# Patient Record
Sex: FEMALE | Race: WHITE | NOT HISPANIC OR LATINO | Employment: FULL TIME | URBAN - METROPOLITAN AREA
[De-identification: names, ages, dates, MRNs, and addresses within clinical notes are randomized per-mention and may not be internally consistent; named-entity substitution may affect disease eponyms.]

---

## 2017-03-27 ENCOUNTER — HOSPITAL ENCOUNTER (OUTPATIENT)
Facility: MEDICAL CENTER | Age: 32
End: 2017-03-27
Attending: OBSTETRICS & GYNECOLOGY | Admitting: OBSTETRICS & GYNECOLOGY
Payer: COMMERCIAL

## 2017-05-03 ENCOUNTER — APPOINTMENT (OUTPATIENT)
Dept: ADMISSIONS | Facility: MEDICAL CENTER | Age: 32
End: 2017-05-03
Attending: OBSTETRICS & GYNECOLOGY
Payer: COMMERCIAL

## 2017-05-03 RX ORDER — CHLORAL HYDRATE 500 MG
2000 CAPSULE ORAL DAILY
Status: ON HOLD | COMMUNITY
End: 2017-05-07 | Stop reason: HOSPADM

## 2017-05-07 ENCOUNTER — HOSPITAL ENCOUNTER (INPATIENT)
Facility: MEDICAL CENTER | Age: 32
LOS: 2 days | End: 2017-05-09
Attending: OBSTETRICS & GYNECOLOGY | Admitting: OBSTETRICS & GYNECOLOGY
Payer: COMMERCIAL

## 2017-05-07 LAB
BASOPHILS # BLD AUTO: 0.5 % (ref 0–1.8)
BASOPHILS # BLD: 0.11 K/UL (ref 0–0.12)
EOSINOPHIL # BLD AUTO: 0.09 K/UL (ref 0–0.51)
EOSINOPHIL NFR BLD: 0.4 % (ref 0–6.9)
ERYTHROCYTE [DISTWIDTH] IN BLOOD BY AUTOMATED COUNT: 42.3 FL (ref 35.9–50)
HCT VFR BLD AUTO: 41.2 % (ref 37–47)
HGB BLD-MCNC: 14.2 G/DL (ref 12–16)
HOLDING TUBE BB 8507: NORMAL
IMM GRANULOCYTES # BLD AUTO: 0.26 K/UL (ref 0–0.11)
IMM GRANULOCYTES NFR BLD AUTO: 1.3 % (ref 0–0.9)
LYMPHOCYTES # BLD AUTO: 3.48 K/UL (ref 1–4.8)
LYMPHOCYTES NFR BLD: 17.4 % (ref 22–41)
MCH RBC QN AUTO: 31.4 PG (ref 27–33)
MCHC RBC AUTO-ENTMCNC: 34.5 G/DL (ref 33.6–35)
MCV RBC AUTO: 91.2 FL (ref 81.4–97.8)
MONOCYTES # BLD AUTO: 0.85 K/UL (ref 0–0.85)
MONOCYTES NFR BLD AUTO: 4.2 % (ref 0–13.4)
NEUTROPHILS # BLD AUTO: 15.26 K/UL (ref 2–7.15)
NEUTROPHILS NFR BLD: 76.2 % (ref 44–72)
NRBC # BLD AUTO: 0 K/UL
NRBC BLD AUTO-RTO: 0 /100 WBC
PLATELET # BLD AUTO: 201 K/UL (ref 164–446)
PMV BLD AUTO: 11.1 FL (ref 9–12.9)
RBC # BLD AUTO: 4.52 M/UL (ref 4.2–5.4)
WBC # BLD AUTO: 20.1 K/UL (ref 4.8–10.8)

## 2017-05-07 PROCEDURE — 700102 HCHG RX REV CODE 250 W/ 637 OVERRIDE(OP): Performed by: ANESTHESIOLOGY

## 2017-05-07 PROCEDURE — 36415 COLL VENOUS BLD VENIPUNCTURE: CPT

## 2017-05-07 PROCEDURE — 700111 HCHG RX REV CODE 636 W/ 250 OVERRIDE (IP)

## 2017-05-07 PROCEDURE — A9270 NON-COVERED ITEM OR SERVICE: HCPCS | Performed by: ANESTHESIOLOGY

## 2017-05-07 PROCEDURE — 59514 CESAREAN DELIVERY ONLY: CPT

## 2017-05-07 PROCEDURE — 305385 HCHG SURGICAL SERVICES 1/4 HOUR

## 2017-05-07 PROCEDURE — 0UT70ZZ RESECTION OF BILATERAL FALLOPIAN TUBES, OPEN APPROACH: ICD-10-PCS | Performed by: OBSTETRICS & GYNECOLOGY

## 2017-05-07 PROCEDURE — 306828 HCHG ANES-TIME GENERAL: Performed by: OBSTETRICS & GYNECOLOGY

## 2017-05-07 PROCEDURE — 304964 HCHG RECOVERY ROOM TIME 1HR

## 2017-05-07 PROCEDURE — 770002 HCHG ROOM/CARE - OB PRIVATE (112)

## 2017-05-07 PROCEDURE — 88302 TISSUE EXAM BY PATHOLOGIST: CPT

## 2017-05-07 PROCEDURE — 85025 COMPLETE CBC W/AUTO DIFF WBC: CPT

## 2017-05-07 PROCEDURE — 700105 HCHG RX REV CODE 258: Performed by: ANESTHESIOLOGY

## 2017-05-07 PROCEDURE — 700111 HCHG RX REV CODE 636 W/ 250 OVERRIDE (IP): Performed by: OBSTETRICS & GYNECOLOGY

## 2017-05-07 PROCEDURE — 700111 HCHG RX REV CODE 636 W/ 250 OVERRIDE (IP): Performed by: ANESTHESIOLOGY

## 2017-05-07 RX ORDER — SODIUM CHLORIDE, SODIUM LACTATE, POTASSIUM CHLORIDE, CALCIUM CHLORIDE 600; 310; 30; 20 MG/100ML; MG/100ML; MG/100ML; MG/100ML
INJECTION, SOLUTION INTRAVENOUS CONTINUOUS
Status: DISCONTINUED | OUTPATIENT
Start: 2017-05-07 | End: 2017-05-07 | Stop reason: HOSPADM

## 2017-05-07 RX ORDER — KETOROLAC TROMETHAMINE 30 MG/ML
30 INJECTION, SOLUTION INTRAMUSCULAR; INTRAVENOUS EVERY 6 HOURS
Status: COMPLETED | OUTPATIENT
Start: 2017-05-08 | End: 2017-05-08

## 2017-05-07 RX ORDER — SIMETHICONE 80 MG
80 TABLET,CHEWABLE ORAL 4 TIMES DAILY PRN
Status: DISCONTINUED | OUTPATIENT
Start: 2017-05-07 | End: 2017-05-09 | Stop reason: HOSPADM

## 2017-05-07 RX ORDER — DIPHENHYDRAMINE HYDROCHLORIDE 50 MG/ML
12.5 INJECTION INTRAMUSCULAR; INTRAVENOUS EVERY 6 HOURS PRN
Status: DISCONTINUED | OUTPATIENT
Start: 2017-05-07 | End: 2017-05-08

## 2017-05-07 RX ORDER — SODIUM CHLORIDE, SODIUM LACTATE, POTASSIUM CHLORIDE, CALCIUM CHLORIDE 600; 310; 30; 20 MG/100ML; MG/100ML; MG/100ML; MG/100ML
1500 INJECTION, SOLUTION INTRAVENOUS ONCE
Status: COMPLETED | OUTPATIENT
Start: 2017-05-07 | End: 2017-05-07

## 2017-05-07 RX ORDER — VITAMIN A ACETATE, BETA CAROTENE, ASCORBIC ACID, CHOLECALCIFEROL, .ALPHA.-TOCOPHEROL ACETATE, DL-, THIAMINE MONONITRATE, RIBOFLAVIN, NIACINAMIDE, PYRIDOXINE HYDROCHLORIDE, FOLIC ACID, CYANOCOBALAMIN, CALCIUM CARBONATE, FERROUS FUMARATE, ZINC OXIDE, CUPRIC OXIDE 3080; 12; 120; 400; 1; 1.84; 3; 20; 22; 920; 25; 200; 27; 10; 2 [IU]/1; UG/1; MG/1; [IU]/1; MG/1; MG/1; MG/1; MG/1; MG/1; [IU]/1; MG/1; MG/1; MG/1; MG/1; MG/1
1 TABLET, FILM COATED ORAL EVERY MORNING
Status: DISCONTINUED | OUTPATIENT
Start: 2017-05-08 | End: 2017-05-09 | Stop reason: HOSPADM

## 2017-05-07 RX ORDER — MISOPROSTOL 200 UG/1
800 TABLET ORAL
Status: DISCONTINUED | OUTPATIENT
Start: 2017-05-07 | End: 2017-05-07 | Stop reason: HOSPADM

## 2017-05-07 RX ORDER — DOCUSATE SODIUM 100 MG/1
100 CAPSULE, LIQUID FILLED ORAL 2 TIMES DAILY PRN
Status: DISCONTINUED | OUTPATIENT
Start: 2017-05-07 | End: 2017-05-09 | Stop reason: HOSPADM

## 2017-05-07 RX ORDER — ONDANSETRON 2 MG/ML
4 INJECTION INTRAMUSCULAR; INTRAVENOUS EVERY 6 HOURS PRN
Status: DISCONTINUED | OUTPATIENT
Start: 2017-05-07 | End: 2017-05-08

## 2017-05-07 RX ORDER — OXYTOCIN 10 [USP'U]/ML
10 INJECTION, SOLUTION INTRAMUSCULAR; INTRAVENOUS ONCE
Status: DISCONTINUED | OUTPATIENT
Start: 2017-05-07 | End: 2017-05-07 | Stop reason: HOSPADM

## 2017-05-07 RX ORDER — METOCLOPRAMIDE HYDROCHLORIDE 5 MG/ML
10 INJECTION INTRAMUSCULAR; INTRAVENOUS ONCE
Status: COMPLETED | OUTPATIENT
Start: 2017-05-07 | End: 2017-05-07

## 2017-05-07 RX ORDER — OXYCODONE HYDROCHLORIDE AND ACETAMINOPHEN 5; 325 MG/1; MG/1
1 TABLET ORAL EVERY 4 HOURS PRN
Status: DISCONTINUED | OUTPATIENT
Start: 2017-05-07 | End: 2017-05-08

## 2017-05-07 RX ORDER — DIPHENHYDRAMINE HYDROCHLORIDE 50 MG/ML
25 INJECTION INTRAMUSCULAR; INTRAVENOUS EVERY 6 HOURS PRN
Status: DISCONTINUED | OUTPATIENT
Start: 2017-05-07 | End: 2017-05-08

## 2017-05-07 RX ORDER — METHYLERGONOVINE MALEATE 0.2 MG/ML
0.2 INJECTION INTRAVENOUS
Status: DISCONTINUED | OUTPATIENT
Start: 2017-05-07 | End: 2017-05-07 | Stop reason: HOSPADM

## 2017-05-07 RX ORDER — SODIUM CHLORIDE, SODIUM LACTATE, POTASSIUM CHLORIDE, CALCIUM CHLORIDE 600; 310; 30; 20 MG/100ML; MG/100ML; MG/100ML; MG/100ML
INJECTION, SOLUTION INTRAVENOUS PRN
Status: DISCONTINUED | OUTPATIENT
Start: 2017-05-07 | End: 2017-05-09 | Stop reason: HOSPADM

## 2017-05-07 RX ORDER — KETOROLAC TROMETHAMINE 30 MG/ML
30 INJECTION, SOLUTION INTRAMUSCULAR; INTRAVENOUS EVERY 6 HOURS
Status: CANCELLED | OUTPATIENT
Start: 2017-05-08 | End: 2017-05-08

## 2017-05-07 RX ORDER — MISOPROSTOL 200 UG/1
800 TABLET ORAL
Status: DISCONTINUED | OUTPATIENT
Start: 2017-05-07 | End: 2017-05-09 | Stop reason: HOSPADM

## 2017-05-07 RX ORDER — OXYCODONE AND ACETAMINOPHEN 10; 325 MG/1; MG/1
1 TABLET ORAL EVERY 4 HOURS PRN
Status: DISCONTINUED | OUTPATIENT
Start: 2017-05-07 | End: 2017-05-08

## 2017-05-07 RX ORDER — NALOXONE HYDROCHLORIDE 0.4 MG/ML
0.1 INJECTION, SOLUTION INTRAMUSCULAR; INTRAVENOUS; SUBCUTANEOUS PRN
Status: DISCONTINUED | OUTPATIENT
Start: 2017-05-07 | End: 2017-05-08

## 2017-05-07 RX ADMIN — SODIUM CHLORIDE, POTASSIUM CHLORIDE, SODIUM LACTATE AND CALCIUM CHLORIDE 1500 ML: 600; 310; 30; 20 INJECTION, SOLUTION INTRAVENOUS at 18:50

## 2017-05-07 RX ADMIN — SODIUM CITRATE AND CITRIC ACID MONOHYDRATE 30 ML: 500; 334 SOLUTION ORAL at 19:15

## 2017-05-07 RX ADMIN — Medication 125 ML/HR: at 21:32

## 2017-05-07 RX ADMIN — METOCLOPRAMIDE 10 MG: 5 INJECTION, SOLUTION INTRAMUSCULAR; INTRAVENOUS at 19:11

## 2017-05-07 RX ADMIN — FAMOTIDINE 20 MG: 10 INJECTION, SOLUTION INTRAVENOUS at 19:12

## 2017-05-07 ASSESSMENT — LIFESTYLE VARIABLES
EVER_SMOKED: NEVER
EVER_SMOKED: NEVER
DO YOU DRINK ALCOHOL: NO
ALCOHOL_USE: NO

## 2017-05-07 ASSESSMENT — PAIN SCALES - GENERAL
PAINLEVEL_OUTOF10: 0

## 2017-05-07 NOTE — IP AVS SNAPSHOT
Related Content Database (RCDb) Access Code: Activation code not generated  Current Related Content Database (RCDb) Status: Active    Blink Bookinghart  A secure, online tool to manage your health information     [employer]’s Related Content Database (RCDb)® is a secure, online tool that connects you to your personalized health information from the privacy of your home -- day or night - making it very easy for you to manage your healthcare. Once the activation process is completed, you can even access your medical information using the Related Content Database (RCDb) malathi, which is available for free in the Apple Malathi store or Google Play store.     Related Content Database (RCDb) provides the following levels of access (as shown below):   My Chart Features   [facility] Primary Care Doctor [facility]  Specialists [facility]  Urgent  Care Non-[facility]  Primary Care  Doctor   Email your healthcare team securely and privately 24/7 X X X X   Manage appointments: schedule your next appointment; view details of past/upcoming appointments X      Request prescription refills. X      View recent personal medical records, including lab and immunizations X X X X   View health record, including health history, allergies, medications X X X X   Read reports about your outpatient visits, procedures, consult and ER notes X X X X   See your discharge summary, which is a recap of your hospital and/or ER visit that includes your diagnosis, lab results, and care plan. X X       How to register for Related Content Database (RCDb):  1. Go to  [email or web address].  2. Click on the Sign Up Now box, which takes you to the New Member Sign Up page. You will need to provide the following information:  a. Enter your Related Content Database (RCDb) Access Code exactly as it appears at the top of this page. (You will not need to use this code after you’ve completed the sign-up process. If you do not sign up before the expiration date, you must request a new code.)   b. Enter your date of birth.   c. Enter your home email address.   d. Click Submit, and follow the next screen’s instructions.  3. Create a Related Content Database (RCDb) ID. This will  be your YeahMobi login ID and cannot be changed, so think of one that is secure and easy to remember.  4. Create a YeahMobi password. You can change your password at any time.  5. Enter your Password Reset Question and Answer. This can be used at a later time if you forget your password.   6. Enter your e-mail address. This allows you to receive e-mail notifications when new information is available in YeahMobi.  7. Click Sign Up. You can now view your health information.    For assistance activating your YeahMobi account, call [phone]

## 2017-05-07 NOTE — IP AVS SNAPSHOT
5/9/2017    Nadine Fink   [address]  [facility] [address]    Dear Nadine:    [facility] wants to ensure your discharge home is safe and you or your loved ones have had all of your questions answered regarding your care after you leave the hospital.    Below is a list of resources and contact information should you have any questions regarding your hospital stay, follow-up instructions, or active medical symptoms.    Questions or Concerns Regarding… Contact   Medical Questions Related to Your Discharge  (7 days a week, 8am-5pm) Contact a Nurse Care Coordinator   [phone]   Medical Questions Not Related to Your Discharge  (24 hours a day / 7 days a week)  Contact the Nurse Health Line   [phone]    Medications or Discharge Instructions Refer to your discharge packet   or contact your [facility] Primary Care Provider   [phone]   Follow-up Appointment(s) Schedule your appointment via Fluid   or contact Scheduling [phone]   Billing Review your statement via Fluid  or contact Billing [phone]   Medical Records Review your records via Fluid   or contact Medical Records [phone]     You may receive a telephone call within two days of discharge. This call is to make certain you understand your discharge instructions and have the opportunity to have any questions answered. You can also easily access your medical information, test results and upcoming appointments via the Fluid free online health management tool. You can learn more and sign up at Flattr/Fluid. For assistance setting up your Fluid account, please call [phone].    Once again, we want to ensure your discharge home is safe and that you have a clear understanding of any next steps in your care. If you have any questions or concerns, please do not hesitate to contact us, we are here for you. Thank you for choosing [facility] for your healthcare needs.    Sincerely,    Your [facility] Healthcare Team

## 2017-05-07 NOTE — IP AVS SNAPSHOT
Home Care Instructions                                                                                                                Nadine Fink   MRN: [id number]    Department:  POST PARTUM 31   2017           Follow-up Information     1. Follow up with Patti Shah M.D.. Schedule an appointment as soon as possible for a visit in 1 week.    Specialty:  OB/Gyn    Contact information    [address]  [place] [address]  [phone]         I assume responsibility for securing a follow-up [place] Screening blood test on my baby within the specified date range.    -                  Discharge Instructions       POSTPARTUM DISCHARGE INSTRUCTIONS FOR MOM    YOB: 1985   Age: 32 y.o.               Admit Date: 2017     Discharge Date: 2017  Attending Doctor:  Patti Shah M.D.                  Allergies:  Sulfa drugs    Discharged to home by car. Discharged via wheelchair, hospital escort: Yes.  Special equipment needed: Not Applicable  Belongings with: Personal  Be sure to schedule a follow-up appointment with your primary care doctor or any specialists as instructed.     Discharge Plan:   Influenza Vaccine Indication: Not indicated: Previously immunized this influenza season and > 8 years of age    REASONS TO CALL YOUR OBSTETRICIAN:  1.   Persistent fever or shaking chills (Temperature higher than 100.4)  2.   Heavy bleeding (soaking more than 1 pad per hour); Passing clots  3.   Foul odor from vagina  4.   Mastitis (Breast infection; breast pain, chills, fever, redness)  5.   Urinary pain, burning or frequency  6.   Episiotomy infection  7.   Abdominal incision infection  8.   Severe depression longer than 24 hours    HAND WASHING  · Prior to handling the baby.  · Before breastfeeding or bottle feeding baby.  · After using the bathroom or changing the baby's diaper.    WOUND CARE  Ask your physician for additional care instructions.  In general:    ·  Incision:     "  · Keep clean and dry.    · Do NOT lift anything heavier than your baby for up to 6 weeks.    · There should not be any opening or pus.      VAGINAL CARE  · Nothing inside vagina for 6 weeks: no sexual intercourse, tampons or douching.  · Bleeding may continue for 2-4 weeks.  Amount may vary.    · Call your physician for heavy bleeding which means soaking more than 1 pad per hour    BIRTH CONTROL  · It is possible to become pregnant at any time after delivery and while breastfeeding.  · Plan to discuss a method of birth control with your physician at your follow up visit. visit.    DIET AND ELIMINATION  · Eating more fiber (bran cereal, fruits, and vegetables) and drinking plenty of fluids will help to avoid constipation.  · Urinary frequency after childbirth is normal.    POSTPARTUM BLUES  During the first few days after birth, you may experience a sense of the \"blues\" which may include impatience, irritability or even crying.  These feeling come and go quickly.  However, as many as 1 in 10 women experience emotional symptoms known as postpartum depression.    Postpartum depression:  May start as early as the second or third day after delivery or take several weeks or months to develop.  Symptoms of \"blues\" are present, but are more intense:  Crying spells; loss of appetite; feelings of hopelessness or loss of control; fear of touching the baby; over concern or no concern at all about the baby; little or no concern about your own appearance/caring for yourself; and/or inability to sleep or excessive sleeping.  Contact your physician if you are experiencing any of these symptoms.    Crisis Hotline:  · [place] Crisis Hotline:  0-865-FGROYHZ  Or 1-108.746.3436  · [place] Crisis Hotline:  1-519.753.4553  Or 843-497-3634    PREVENTING SHAKEN BABY:  If you are angry or stressed, PUT THE BABY IN THE CRIB, step away, take some deep breaths, and wait until you are calm to care for the baby.  DO NOT SHAKE THE BABY.  You are " "not alone, call a supporter for help.    · Crisis Call Center 24/7 crisis line 940-299-3982 or 1-946.529.9274  · You can also text them, text \"ANSWER\" to 777295    QUIT SMOKING/TOBACCO USE:  I understand the use of any tobacco products increases my chance of suffering from future heart disease and could cause other illnesses which may shorten my life. Quitting the use of tobacco products is the single most important thing I can do to improve my health. For further information on smoking / tobacco cessation call a Toll Free Quit Line at 1-162.704.1787 (*National Cancer [place]) or 1-671.220.6901 (American Lung Association) or you can access the web based program at www.lungusa.org.    · [place] Tobacco Users Help Line:  (523) 896-3154       Toll Free: 1-415.517.2877  · Quit Tobacco Program [facility] (671)079-7858    DEPRESSION / SUICIDE RISK:  As you are discharged from this [facility], it is important to learn how to keep safe from harming yourself.    Recognize the warning signs:  · Abrupt changes in personality, positive or negative- including increase in energy   · Giving away possessions  · Change in eating patterns- significant weight changes-  positive or negative  · Change in sleeping patterns- unable to sleep or sleeping all the time   · Unwillingness or inability to communicate  · Depression  · Unusual sadness, discouragement and loneliness  · Talk of wanting to die  · Neglect of personal appearance   · Rebelliousness- reckless behavior  · Withdrawal from people/activities they love  · Confusion- inability to concentrate     If you or a loved one observes any of these behaviors or has concerns about self-harm, here's what you can do:  · Talk about it- your feelings and reasons for harming yourself  · Remove any means that you might use to hurt yourself (examples: pills, rope, extension cords, firearm)  · Get professional help from the community (Mental Health, Substance " Abuse, psychological counseling)  · Do not be alone:Call your Safe Contact- someone whom you trust who will be there for you.  · Call your local CRISIS HOTLINE 814-7591 or 889-716-6805  · Call your local Children's Mobile Crisis Response Team [place] (801) 766-2917 or www.Sticky  · Call the toll free National Suicide Prevention Hotlines   · National Suicide Prevention Lifeline 801-924-LCJU (7780)  · [place] Hope Line Network 800-SUICIDE (189-6411)    DISCHARGE SURVEY:  Thank you for choosing [facility].  We hope we provided you with very good care.  You may be receiving a survey in the mail.  Please fill it out.  Your opinion is valuable to us.    ADDITIONAL EDUCATIONAL MATERIALS GIVEN TO PATIENT:        My signature on this form indicates that:  1.  I have reviewed and understand the above information  2.  My questions regarding this information have been answered to my satisfaction.  3.  I have formulated a plan with my discharge nurse to obtain my prescribed medication for home.         Discharge Medication Instructions:    Below are the medications your physician expects you to take upon discharge:    Review all your home medications and newly ordered medications with your doctor and/or pharmacist. Follow medication instructions as directed by your doctor and/or pharmacist.    Please keep your medication list with you and share with your physician.               Medication List      START taking these medications        Instructions    Morning Afternoon Evening Bedtime    ibuprofen 800 MG Tabs   Last time this was given:  600 mg on 5/9/2017  8:43 AM   Commonly known as:  MOTRIN        Take 1 Tab by mouth every 8 hours as needed.   Dose:  800 mg                        oxycodone-acetaminophen 5-325 MG Tabs   Commonly known as:  PERCOCET        Take 1-2 Tabs by mouth every four hours as needed.   Dose:  1-2 Tab                             Where to Get Your Medications      Information about  where to get these medications is not yet available     ! Ask your nurse or doctor about these medications    - ibuprofen 800 MG Tabs  - oxycodone-acetaminophen 5-325 MG Tabs            Crisis Hotline:     [place] Crisis Hotline:  5-707-PEUKIVZ or 1-923.250.1964    [place] Crisis Hotline:    1-707.559.1103 or 023-291-6393        Disclaimer           _____________________________________                     __________       ________       Patient/Mother Signature or Legal                          Date                   Time

## 2017-05-08 LAB
ERYTHROCYTE [DISTWIDTH] IN BLOOD BY AUTOMATED COUNT: 43.9 FL (ref 35.9–50)
HCT VFR BLD AUTO: 34 % (ref 37–47)
HGB BLD-MCNC: 11.4 G/DL (ref 12–16)
MCH RBC QN AUTO: 31.1 PG (ref 27–33)
MCHC RBC AUTO-ENTMCNC: 33.5 G/DL (ref 33.6–35)
MCV RBC AUTO: 92.9 FL (ref 81.4–97.8)
PLATELET # BLD AUTO: 174 K/UL (ref 164–446)
PMV BLD AUTO: 11 FL (ref 9–12.9)
RBC # BLD AUTO: 3.66 M/UL (ref 4.2–5.4)
WBC # BLD AUTO: 23.7 K/UL (ref 4.8–10.8)

## 2017-05-08 PROCEDURE — 700105 HCHG RX REV CODE 258: Performed by: OBSTETRICS & GYNECOLOGY

## 2017-05-08 PROCEDURE — 770002 HCHG ROOM/CARE - OB PRIVATE (112)

## 2017-05-08 PROCEDURE — 700111 HCHG RX REV CODE 636 W/ 250 OVERRIDE (IP): Performed by: ANESTHESIOLOGY

## 2017-05-08 PROCEDURE — 36415 COLL VENOUS BLD VENIPUNCTURE: CPT

## 2017-05-08 PROCEDURE — 85027 COMPLETE CBC AUTOMATED: CPT

## 2017-05-08 RX ORDER — MORPHINE SULFATE 4 MG/ML
4 INJECTION, SOLUTION INTRAMUSCULAR; INTRAVENOUS
Status: DISCONTINUED | OUTPATIENT
Start: 2017-05-08 | End: 2017-05-09 | Stop reason: HOSPADM

## 2017-05-08 RX ORDER — OXYCODONE HYDROCHLORIDE AND ACETAMINOPHEN 5; 325 MG/1; MG/1
1 TABLET ORAL EVERY 4 HOURS PRN
Status: DISCONTINUED | OUTPATIENT
Start: 2017-05-08 | End: 2017-05-09 | Stop reason: HOSPADM

## 2017-05-08 RX ORDER — OXYCODONE AND ACETAMINOPHEN 10; 325 MG/1; MG/1
1 TABLET ORAL EVERY 4 HOURS PRN
Status: DISCONTINUED | OUTPATIENT
Start: 2017-05-08 | End: 2017-05-09 | Stop reason: HOSPADM

## 2017-05-08 RX ORDER — IBUPROFEN 600 MG/1
600 TABLET ORAL EVERY 6 HOURS PRN
Status: DISCONTINUED | OUTPATIENT
Start: 2017-05-08 | End: 2017-05-09 | Stop reason: HOSPADM

## 2017-05-08 RX ADMIN — KETOROLAC TROMETHAMINE 30 MG: 30 INJECTION, SOLUTION INTRAMUSCULAR at 19:42

## 2017-05-08 RX ADMIN — SODIUM CHLORIDE, POTASSIUM CHLORIDE, SODIUM LACTATE AND CALCIUM CHLORIDE: 600; 310; 30; 20 INJECTION, SOLUTION INTRAVENOUS at 05:14

## 2017-05-08 RX ADMIN — KETOROLAC TROMETHAMINE 30 MG: 30 INJECTION, SOLUTION INTRAMUSCULAR at 13:50

## 2017-05-08 RX ADMIN — KETOROLAC TROMETHAMINE 30 MG: 30 INJECTION, SOLUTION INTRAMUSCULAR at 08:02

## 2017-05-08 RX ADMIN — KETOROLAC TROMETHAMINE 30 MG: 30 INJECTION, SOLUTION INTRAMUSCULAR at 02:18

## 2017-05-08 ASSESSMENT — PAIN SCALES - GENERAL
PAINLEVEL_OUTOF10: 1
PAINLEVEL_OUTOF10: 0
PAINLEVEL_OUTOF10: 0
PAINLEVEL_OUTOF10: 3
PAINLEVEL_OUTOF10: 0
PAINLEVEL_OUTOF10: 3

## 2017-05-08 NOTE — PROGRESS NOTES
"POD#1    S: Doing well. Pain controlled. Lochia normal. No flatus yet. Angella PO     O: Blood pressure 93/55, pulse 61, temperature 36.4 °C (97.5 °F), resp. rate 18, height 1.6 m (5' 3\"), weight 66.679 kg (147 lb), last menstrual period 2016, SpO2 98 %.  Gen: NAD  Fundus firm below umbilicus  Incision: +staples. Bandage c/d/i    Labs: Hct 41-->34    A/P 31yo  s/p ERCS/B salpingectomy  1. Routine postop care  2. Dispo: home in 1-2 days   "

## 2017-05-08 NOTE — OP REPORT
DATE OF SERVICE:  2017    DATE OF PROCEDURE:  2017.    PREOPERATIVE DIAGNOSES:  1.  Intrauterine pregnancy at 39 weeks.  2.  Previous  section.  3.  Breech presentation.  4.  Active labor with advanced cervical dilatation.  5.  Desires permanent sterilization.    POSTOPERATIVE DIAGNOSES:  1.  Intrauterine pregnancy at 39 weeks.  2.  Previous  section.  3.  Breech presentation.  4.  Active labor with advanced cervical dilatation.  5.  Desires permanent sterilization.    PROCEDURE:  Repeat low transverse  section with bilateral   salpingectomy.    SURGEON:  Nadine Dobbs MD    ASSISTANT:  Samira Roach MD.    ANESTHESIOLOGIST:  Osman Crenshaw MD.    ANESTHESIA:  Spinal anesthesia.    COMPLICATIONS:  None.    ESTIMATED BLOOD LOSS:  400 mL    SPECIMENS:  Include bilateral tubal segments.    INTRAOPERATIVE FINDINGS:  Include a male infant, complete breech presentation.    Clear amniotic fluid.  Normal appearing uterus, tubes, ovaries bilaterally.    Infant, Apgars are 8 and 9 at 1 and 5 minutes respectively.  Weight pending,   his name Quinton.    NARRATIVE:  After proper consent was obtained, the patient was taken to the   operating room where spinal anesthesia was obtained by Dr. Crenshaw.  She was   placed in dorsal supine position with leftward tilt.  Fetal heart tones were   monitored and reassuring.  Sterile Light catheter was placed and she was   prepped in normal sterile fashion.  After the appropriate time interval, she   was then draped in normal sterile fashion and a Pfannenstiel skin incision is   made with a scalpel directly over her previous scar and carried down to the   underlying fascia with electrocautery.  Fascia was nicked sharply in midline.    Fascial incisions extended laterally with Woody scissors.  Inferior aspect of   fascial incision was grasped with Kochers, elevated, and rectus muscles   dissected off sharply.  Attention was turned to the superior aspect in  a   similar fashion grasped, elevated, and rectus muscles dissected off.  The   rectus muscles  in midline bluntly.  Peritoneum was identified and   entered sharply and peritoneal incision extended inferiorly and superiorly   with good visualization of the bladder.  Bladder blade is inserted.    Vesicouterine peritoneum is tented up and a bladder flap was created using   Metzenbaum scissors.  Bladder is dissected off the lower uterine segment and   bladder blade is reinserted to effectively protect the bladder from the   operative field and the lower uterine segments incised in a transverse   U-shaped fashion with a scalpel.  Uterine incision extended manually.    Membranes ruptured, clear fluid noted.  Infant in a complete breech   presentation, delivered buttocks first, tangential traction to both legs   delivered, wrapped in a moist towel, _____ both arms delivered and the head   was delivered without extension or traction.  The cord was doubly clamped and   cut.  Nose and mouth suctioned with bulb suction and a pink vigorous crying   infant handed directly to the awaiting nursing team.  Placenta was delivered   by gentle traction on the cord and extrauterine massage.  Uterus was   exteriorized and cleared of all clots and debris with a dry laparotomy sponge.    Lower uterine segment was closed with #1 chromic running locked fashion.    Second layer of same suture was used to effectively close uterus in a 2-layer   fashion.  Excellent hemostasis noted.  Verbal consent was once again obtained   from the patient.  She verbalizes her desire for tubal sterilization via   salpingectomy.  Therefore, the right fallopian tube was identified, followed   out to the fimbriated end, grasped at approximately x2 and excised in its   entirety with the LigaSure.  All other areas are hemostatic and then sent to   pathology for confirmation.  Attention then turned to the left fallopian tube   followed out to the  fimbriated end, grasped with Babcocks x2 and the entire   length of fallopian tube, including the fimbriated end are transected and   removed with the LigaSure.  Both sides are evaluated and were hemostatic.  The   uterus was returned to the abdomen.  Specimens were sent to pathology for   confirmation.  The lower uterine segment again reinspected and found to be   hemostatic.  Both sides of tubal salpingectomy are visualized and are   hemostatic.  The peritoneum was grasped with Kocher clamps x3 and closed with   running 3-0 Vicryl suture.  A 3-0 Vicryl was used horizontal mattress to   reapproximate the rectus muscles in the midline.  Copious irrigation.  The   fascia was then closed with 0 Vicryl in a running fashion after the rectus   muscle was examined and found to be hemostatic.  Manual palpation of the   fascial layers confirms adequate thorough closing.  Copious irrigation.    Hemostasis noted.  Subcutaneous fatty layer reapproximated with 3-0 Vicryl   sutures and skin was closed with staples.  Sponge, lap, needle and instrument   counts correct x3, and the patient was taken to the recovery room with her   infant in stable condition.       ____________________________________     MD BEVERLEY VAZQUEZ / MINISTERIO    DD:  05/07/2017 20:44:56  DT:  05/08/2017 00:19:27    D#:  [id number]  Job#:  [id number]    cc: SAY CALABRESE MD

## 2017-05-08 NOTE — PROGRESS NOTES
Pt with positive bowel sounds but not passing flatus. Pt states she is hungry and tolerated clear liquid breakfast. Pt denies any current n/v.

## 2017-05-08 NOTE — PROGRESS NOTES
Patient arrived via gurney with belongings to room S323 at 2140. Report received from COLLEEN Smith. Educated patient on cuddles system, infant identification, emergency pull cord, room call light, and skylight. Bands checked. Assessment done, fundus firm, lochia light, vital signs stable, PIV infusing 125 of Pit in right hand. Pt placed on . Light draining. C/s site CDI. Patient states pain is 0. No meds given. Patient aware of infant sleeping policies. Reviewed plan of care and encouraged to call with needs or prior to ambulating. Call light in place. Will continue to monitor.

## 2017-05-08 NOTE — PROGRESS NOTES
: Pt transferred to OR 1 for repeat  section  : Delivery of viable, male infant. Apgars 2022: Pt transferred to PACU.   : Pt and infant transferred to postpartum in stable condition. Report given to Ngozi MACIAS. ID bands verified. Cuddles activated.

## 2017-05-08 NOTE — OR SURGEON
Immediate Post-Operative Note      PreOp Diagnosis: IUP @ 39 weeks; previous c/section; breech, active labor; desires strelization    PostOp Diagnosis: same    Procedure(s):  REPEAT C SECTION WITH BILATERAL SALPINGECTOMY    Surgeon(s):  Nadine Dobbs M.D.    Anesthesiologist/Type of Anesthesia: Osman Crenshaw MD  Anesthesiologist: (Unknown)/Spinal    Surgical Staff:  Circulator: (Unknown)  Scrub Person: (Unknown)    Specimen: bilateral tubal segments    Estimated Blood Loss: 400cc    Findings: male, complete breech    Complications: none        5/7/2017 8:26 PM Nadine Dobbs

## 2017-05-08 NOTE — H&P
DATE OF ADMISSION:  2017    IDENTIFICATION:  This is a 32-year-old  female  3, para   1-0-1-1, EDC is 2017.  Estimated gestational age is 39 weeks.    CHIEF COMPLAINT:  Contractions.    HISTORY OF PRESENT ILLNESS:  This patient with prenatal care in our office   under the care of Dr. Malena Shah, who is scheduled for repeat    section and bilateral salpingectomy tomorrow.  She also reports to me that the   baby is in a breech presentation and they do not have that documented.  She   showed up with complaints of contractions, was 9 cm dilated with a bulging bag   of water.  No presenting palpable by the evaluating RN.    OBSTETRICAL HISTORY:  Significant for a 38-week male, 8 pound 8 ounce    for polyhydramnios.    GYNECOLOGIC HISTORY:  Benign.  No history of herpes simplex virus, oral or   genital, patient or .    PAST MEDICAL HISTORY:  Illnesses:  None.    PAST SURGICAL HISTORY:   section and oral surgery.    ALLERGIES:  SULFA.    CURRENT MEDICATIONS:  Prenatal vitamins.    SOCIAL HISTORY:  She denies alcohol, tobacco or drug use.  Her 's name   is Didi.    REVIEW OF SYSTEMS:  She denies signs and symptoms of pregnancy-induced   hypertension, reports active fetal movement, no vaginal bleeding and is unsure   about rupture of membranes.    PHYSICAL EXAMINATION:  VITAL SIGNS:  She is afebrile.  Vital signs stable, within normal limits.  LUNGS:  Clear.  HEART:  Regular rate and rhythm.    ASSESSMENT:  1.  Intrauterine pregnancy at 39 weeks.  2.  Active labor with advanced cervical dilatation.  3.  Reportedly breech presentation.  4.  Previous  sections.  5.  Desires permanent sterilization.    PLAN:  The patient is admitted for repeat  delivery and bilateral   salpingectomy at this time.    DISCUSSION:  Risks, benefits, and alternatives discussed in detail including   increased risks with the second  delivery.  Risks  including, but not   limited to risks of anesthesia, risk of injury and other intra-abdominal   pelvic organs including but not limited to bowel, bladder, blood vessels,   ureters, nerves, all requiring further surgical repair; delayed diagnosis,   possible permanent organ damage, risk of hemorrhage requiring transfusion, she   verbally consents to blood transfusion in emergent situation.  Risk of bowel   injury requiring repair up to and including permanent colostomy, risk of   bladder and ureteral injuries requiring repair up to and including permanent   catheterization reimplantation, risk of nerve damage causing chronic pain   syndromes and loss of limb mobility.  She is adamant that she wants no future   pregnancies, risks of bilateral salpingectomy versus tubal ligation versus   none, all discussed in detail.  All of her questions were answered and she   would like to proceed as scheduled for tomorrow.       ____________________________________     MD BEVERLEY VAZQUEZ / MINISTERIO    DD:  05/07/2017 19:13:04  DT:  05/07/2017 22:44:16    D#:  [id number]  Job#:  [id number]

## 2017-05-08 NOTE — CARE PLAN
Problem: Potential for postpartum infection related to surgical incision, compromised uterine condition, urinary tract or respiratory compromise  Goal: Patient will be afebrile and free from signs and symptoms of infection  Outcome: PROGRESSING AS EXPECTED

## 2017-05-08 NOTE — CARE PLAN
Problem: Urinary Elimination:  Goal: Ability to reestablish a normal urinary elimination pattern will improve  Intervention: Evaluate need to continue indwelling urinary catheter  Pt within appropriate time frame for Light. Pt w/ adequate UOP.      Problem: Pain Management  Goal: Pain level will decrease to patient’s comfort goal  Intervention: Follow pain managment plan developed in collaboration with patient and Interdisciplinary Team  Pt getting Toradol scheduled w/ good results.

## 2017-05-08 NOTE — PROGRESS NOTES
Pt's SpO2 dropping to 87-89% on RA while asleep. Pt placed on 1L NC. Maintaining sats. No s/s of respiratory distress.

## 2017-05-08 NOTE — PROGRESS NOTES
Lara Hill R.N. Lactation Consultant Signed  Progress Notes 5/8/2017  9:30 AM      Expand All Collapse All    Lactation note:    Met with this mother whose first baby had a cleft lip and palate and she didn't put baby to breast but pumped and fed her breast milk for a year. She has many questions about holds and latch. Discussed the need for a deep latch for comfort and milk transfer. Baby latched well and nursed for 15 min at the left breast, was too sleepy to latch to other breast. Discussed signs of milk transfer. Asked parents to keep track of baby's feeds and output at home. Mom was given the BF booklet with outpatient lactation resources. Irene Loya, IBCLC

## 2017-05-08 NOTE — CARE PLAN
Problem: Altered physiologic condition related to postoperative  delivery  Goal: Patient physiologically stable as evidenced by normal lochia, palpable uterine involution and vital signs within normal limits  Outcome: PROGRESSING AS EXPECTED

## 2017-05-09 VITALS
TEMPERATURE: 98.1 F | BODY MASS INDEX: 26.05 KG/M2 | WEIGHT: 147 LBS | RESPIRATION RATE: 18 BRPM | HEIGHT: 63 IN | SYSTOLIC BLOOD PRESSURE: 95 MMHG | HEART RATE: 71 BPM | OXYGEN SATURATION: 96 % | DIASTOLIC BLOOD PRESSURE: 64 MMHG

## 2017-05-09 PROCEDURE — A9270 NON-COVERED ITEM OR SERVICE: HCPCS | Performed by: OBSTETRICS & GYNECOLOGY

## 2017-05-09 PROCEDURE — 700102 HCHG RX REV CODE 250 W/ 637 OVERRIDE(OP): Performed by: OBSTETRICS & GYNECOLOGY

## 2017-05-09 PROCEDURE — 700112 HCHG RX REV CODE 229: Performed by: OBSTETRICS & GYNECOLOGY

## 2017-05-09 RX ORDER — IBUPROFEN 800 MG/1
800 TABLET ORAL EVERY 8 HOURS PRN
Qty: 30 TAB | Refills: 2 | Status: SHIPPED | OUTPATIENT
Start: 2017-05-09 | End: 2017-10-27

## 2017-05-09 RX ORDER — OXYCODONE HYDROCHLORIDE AND ACETAMINOPHEN 5; 325 MG/1; MG/1
1-2 TABLET ORAL EVERY 4 HOURS PRN
Qty: 20 TAB | Refills: 0 | Status: SHIPPED | OUTPATIENT
Start: 2017-05-09 | End: 2017-10-27

## 2017-05-09 RX ADMIN — DOCUSATE SODIUM 100 MG: 100 CAPSULE ORAL at 08:43

## 2017-05-09 RX ADMIN — IBUPROFEN 600 MG: 600 TABLET, FILM COATED ORAL at 01:57

## 2017-05-09 RX ADMIN — IBUPROFEN 600 MG: 600 TABLET, FILM COATED ORAL at 08:43

## 2017-05-09 RX ADMIN — Medication 1 TABLET: at 08:43

## 2017-05-09 ASSESSMENT — PAIN SCALES - GENERAL
PAINLEVEL_OUTOF10: 5
PAINLEVEL_OUTOF10: 3

## 2017-05-09 NOTE — CONSULTS
"Spoke with mom regarding breastfeeding.  Mom states that baby is a \"champ\" at the breast and she has no questions or concerns at this time. Outpatient resources reviewed.  "

## 2017-05-09 NOTE — DISCHARGE INSTRUCTIONS
POSTPARTUM DISCHARGE INSTRUCTIONS FOR MOM    YOB: 1985   Age: 32 y.o.               Admit Date: 2017     Discharge Date: 2017  Attending Doctor:  Patti Shah M.D.                  Allergies:  Sulfa drugs    Discharged to home by car. Discharged via wheelchair, hospital escort: Yes.  Special equipment needed: Not Applicable  Belongings with: Personal  Be sure to schedule a follow-up appointment with your primary care doctor or any specialists as instructed.     Discharge Plan:   Influenza Vaccine Indication: Not indicated: Previously immunized this influenza season and > 8 years of age    REASONS TO CALL YOUR OBSTETRICIAN:  1.   Persistent fever or shaking chills (Temperature higher than 100.4)  2.   Heavy bleeding (soaking more than 1 pad per hour); Passing clots  3.   Foul odor from vagina  4.   Mastitis (Breast infection; breast pain, chills, fever, redness)  5.   Urinary pain, burning or frequency  6.   Episiotomy infection  7.   Abdominal incision infection  8.   Severe depression longer than 24 hours    HAND WASHING  · Prior to handling the baby.  · Before breastfeeding or bottle feeding baby.  · After using the bathroom or changing the baby's diaper.    WOUND CARE  Ask your physician for additional care instructions.  In general:    ·  Incision:      · Keep clean and dry.    · Do NOT lift anything heavier than your baby for up to 6 weeks.    · There should not be any opening or pus.      VAGINAL CARE  · Nothing inside vagina for 6 weeks: no sexual intercourse, tampons or douching.  · Bleeding may continue for 2-4 weeks.  Amount may vary.    · Call your physician for heavy bleeding which means soaking more than 1 pad per hour    BIRTH CONTROL  · It is possible to become pregnant at any time after delivery and while breastfeeding.  · Plan to discuss a method of birth control with your physician at your follow up visit. visit.    DIET AND ELIMINATION  · Eating more fiber (bran  "cereal, fruits, and vegetables) and drinking plenty of fluids will help to avoid constipation.  · Urinary frequency after childbirth is normal.    POSTPARTUM BLUES  During the first few days after birth, you may experience a sense of the \"blues\" which may include impatience, irritability or even crying.  These feeling come and go quickly.  However, as many as 1 in 10 women experience emotional symptoms known as postpartum depression.    Postpartum depression:  May start as early as the second or third day after delivery or take several weeks or months to develop.  Symptoms of \"blues\" are present, but are more intense:  Crying spells; loss of appetite; feelings of hopelessness or loss of control; fear of touching the baby; over concern or no concern at all about the baby; little or no concern about your own appearance/caring for yourself; and/or inability to sleep or excessive sleeping.  Contact your physician if you are experiencing any of these symptoms.    Crisis Hotline:  · [place] Crisis Hotline:  9-785-TRJUCYH  Or 1-799.332.8037  · [place] Crisis Hotline:  1-995.212.1849  Or 311-562-2340    PREVENTING SHAKEN BABY:  If you are angry or stressed, PUT THE BABY IN THE CRIB, step away, take some deep breaths, and wait until you are calm to care for the baby.  DO NOT SHAKE THE BABY.  You are not alone, call a supporter for help.    · Crisis Call Center 24/7 crisis line 369-724-6169 or 1-749.977.9999  · You can also text them, text \"ANSWER\" to 986216    QUIT SMOKING/TOBACCO USE:  I understand the use of any tobacco products increases my chance of suffering from future heart disease and could cause other illnesses which may shorten my life. Quitting the use of tobacco products is the single most important thing I can do to improve my health. For further information on smoking / tobacco cessation call a Toll Free Quit Line at 1-168.897.9597 (*National Cancer [place]) or 1-210.701.9156 (American Lung Association) or " you can access the web based program at www.lungusa.org.    · [place] Tobacco Users Help Line:  (532) 375-8919       Toll Free: 1-816.189.6257  · Quit Tobacco Program [facility] (085)462-1344    DEPRESSION / SUICIDE RISK:  As you are discharged from this [facility], it is important to learn how to keep safe from harming yourself.    Recognize the warning signs:  · Abrupt changes in personality, positive or negative- including increase in energy   · Giving away possessions  · Change in eating patterns- significant weight changes-  positive or negative  · Change in sleeping patterns- unable to sleep or sleeping all the time   · Unwillingness or inability to communicate  · Depression  · Unusual sadness, discouragement and loneliness  · Talk of wanting to die  · Neglect of personal appearance   · Rebelliousness- reckless behavior  · Withdrawal from people/activities they love  · Confusion- inability to concentrate     If you or a loved one observes any of these behaviors or has concerns about self-harm, here's what you can do:  · Talk about it- your feelings and reasons for harming yourself  · Remove any means that you might use to hurt yourself (examples: pills, rope, extension cords, firearm)  · Get professional help from the community (Mental Health, Substance Abuse, psychological counseling)  · Do not be alone:Call your Safe Contact- someone whom you trust who will be there for you.  · Call your local CRISIS HOTLINE 596-5174 or 884-039-2846  · Call your local Children's Mobile Crisis Response Team [place] (799) 222-5331 or www.[employer]  · Call the toll free National Suicide Prevention Hotlines   · National Suicide Prevention Lifeline 651-037-ZCAU (7121)  · National Hope Line Network 800-SUICIDE (477-1506)    DISCHARGE SURVEY:  Thank you for choosing [facility].  We hope we provided you with very good care.  You may be receiving a survey in the mail.  Please fill it  out.  Your opinion is valuable to us.    ADDITIONAL EDUCATIONAL MATERIALS GIVEN TO PATIENT:        My signature on this form indicates that:  1.  I have reviewed and understand the above information  2.  My questions regarding this information have been answered to my satisfaction.  3.  I have formulated a plan with my discharge nurse to obtain my prescribed medication for home.

## 2017-05-09 NOTE — CARE PLAN
Problem: Altered physiologic condition related to postoperative  delivery  Goal: Patient physiologically stable as evidenced by normal lochia, palpable uterine involution and vital signs within normal limits  Outcome: PROGRESSING AS EXPECTED  VSS. Fundus firm. Lochia light.    Problem: Alteration in comfort related to surgical incision and/or after birth pains  Goal: Patient verbalizes acceptable pain level  Outcome: PROGRESSING AS EXPECTED  Pt pain managed with PRN pain meds per MAR.

## 2017-05-09 NOTE — PROGRESS NOTES
"POD#2    S: pain controlled. Lochia normal. +flatus and void. Would like to go home today     O: Blood pressure 91/50, pulse 77, temperature 36.9 °C (98.5 °F), resp. rate 16, height 1.6 m (5' 3\"), weight 66.679 kg (147 lb), last menstrual period 2016, SpO2 95 %.  Gen: NAD  Fundus firm below umbilicus  Incision: +staples. No erythema/exudate     Labs: Hct 41-->34    A/P 31yo  s/p ERCS/B salpingectomy  1. Routine postop care  2. Dispo: home today               "

## 2017-05-09 NOTE — PROGRESS NOTES
Assessment complete. Fundus firm, lochia light. VSS. Tolerating diet. Voiding without difficulty. Incision open to air, staples in place. Pain controlled with prn medications per mar. Will offer pain medications as they become available. FOB at bedside, bonding with pt/baby. POC discussed with pt. Encouraged to call with needs. Call light in place.     Pt declines education to be done at this time. States maybe tomorrow.

## 2017-05-09 NOTE — CARE PLAN
Problem: Potential for postpartum infection related to surgical incision, compromised uterine condition, urinary tract or respiratory compromise  Goal: Patient will be afebrile and free from signs and symptoms of infection  Outcome: PROGRESSING AS EXPECTED  No signs of infection noted.

## 2017-05-10 NOTE — DISCHARGE SUMMARY
DATE OF ADMISSION:  2017    DATE OF DISCHARGE:  2017    DIAGNOSES ON ADMISSION:  1.  A 32-year-old  2, para 1, at 39 weeks.  2.  Active labor.  3.  History of prior  section.  4.  Breech presentation.  5.  Desires permanent sterilization.    PROCEDURE:  Repeat  via Pfannenstiel skin incision with bilateral   salpingectomy.    POSTOPERATIVE DIAGNOSES:  1.  A 32-year-old  2, para 1, at 39 weeks.  2.  Active labor.  3.  History of prior  section.  4.  Breech presentation.  5.  Desires permanent sterilization.    HOSPITAL COURSE:  The patient is a 32-year-old  2, para 1 who presented   to the day prior to her scheduled  in active labor.  At the time of   presentation, she had breech presentation and was 9 cm dilated.  She underwent   the procedures as listed above without complications.    Postoperatively, the patient has done well.  She has remained afebrile with   stable vital signs.  Fundus is firm below the umbilicus.  Pain is well   controlled.  Lochia is normal.  She is ambulating and voiding without   difficulty.  Her incision has staples in place, which will be removed and   Steri-Strips placed.    She is Rh positive, rubella immune, GBS negative.    DISCHARGE MEDICATIONS:  1.  Percocet 5/325 one to two tablets p.o. q. 4 hours p.r.n. pain, dispensed   30.  2.  Ibuprofen.    DISCHARGE INSTRUCTIONS:  1.  Patient has been given routine precautions for bleeding, pain, infection,   VTE, difficulty with breastfeeding and postpartum depression.  2.  Routine postoperative care and activity limitations have been reviewed   with her.  3.  The patient will follow up with me in 2 months.       ____________________________________     SAY CALABRESE, MD SANCH[facility] / MINISTERIO    DD:  2017 07:19:29  DT:  2017 19:14:20    D#:  [id number]  Job#:  [id number]

## 2017-10-27 ENCOUNTER — OFFICE VISIT (OUTPATIENT)
Dept: URGENT CARE | Facility: CLINIC | Age: 32
End: 2017-10-27
Payer: COMMERCIAL

## 2017-10-27 ENCOUNTER — HOSPITAL ENCOUNTER (OUTPATIENT)
Facility: MEDICAL CENTER | Age: 32
End: 2017-10-27
Attending: PHYSICIAN ASSISTANT
Payer: COMMERCIAL

## 2017-10-27 VITALS
TEMPERATURE: 97.6 F | DIASTOLIC BLOOD PRESSURE: 70 MMHG | HEART RATE: 62 BPM | WEIGHT: 115 LBS | RESPIRATION RATE: 20 BRPM | SYSTOLIC BLOOD PRESSURE: 110 MMHG | BODY MASS INDEX: 20.38 KG/M2 | OXYGEN SATURATION: 97 % | HEIGHT: 63 IN

## 2017-10-27 DIAGNOSIS — N30.01 ACUTE CYSTITIS WITH HEMATURIA: ICD-10-CM

## 2017-10-27 LAB
APPEARANCE UR: CLEAR
BILIRUB UR STRIP-MCNC: ABNORMAL MG/DL
COLOR UR AUTO: YELLOW
GLUCOSE UR STRIP.AUTO-MCNC: ABNORMAL MG/DL
KETONES UR STRIP.AUTO-MCNC: ABNORMAL MG/DL
LEUKOCYTE ESTERASE UR QL STRIP.AUTO: ABNORMAL
NITRITE UR QL STRIP.AUTO: POSITIVE
PH UR STRIP.AUTO: 8.5 [PH] (ref 5–8)
PROT UR QL STRIP: ABNORMAL MG/DL
RBC UR QL AUTO: ABNORMAL
SP GR UR STRIP.AUTO: 1
UROBILINOGEN UR STRIP-MCNC: ABNORMAL MG/DL

## 2017-10-27 PROCEDURE — 87077 CULTURE AEROBIC IDENTIFY: CPT

## 2017-10-27 PROCEDURE — 99214 OFFICE O/P EST MOD 30 MIN: CPT | Performed by: PHYSICIAN ASSISTANT

## 2017-10-27 PROCEDURE — 87086 URINE CULTURE/COLONY COUNT: CPT

## 2017-10-27 PROCEDURE — 81002 URINALYSIS NONAUTO W/O SCOPE: CPT | Performed by: PHYSICIAN ASSISTANT

## 2017-10-27 PROCEDURE — 87186 SC STD MICRODIL/AGAR DIL: CPT

## 2017-10-27 RX ORDER — NITROFURANTOIN 25; 75 MG/1; MG/1
100 CAPSULE ORAL EVERY 12 HOURS
Qty: 10 CAP | Refills: 0 | Status: SHIPPED | OUTPATIENT
Start: 2017-10-27 | End: 2017-11-01

## 2017-10-27 NOTE — PROGRESS NOTES
Subjective:      Pt is a 32 y.o. female who presents with Dysuria (Couple days urinary pain )            HPI  PT comes into the UC with a chief complaint of dysuria, burning on urination, urgency, frequency, and bladder pressure and has NOT noticed blood in her urine x2-3 days. PT denies fevers or chills, CP, SOB, NVD, paresthesias, headaches, dizziness, change in vision, hives, or joint pain. Pt states she had a UTI a year ago. She states she has not taken any RX meds for this issue. PT states the pain is a 6/10 with urination, aching in nature and worse at night. She denies flank or back pain as well. The pt's medication list, problem list, and allergies have been evaluated and reviewed during today's visit.        PMH:  Negative per pt.      PSH:  Past Surgical History:   Procedure Laterality Date   • REPEAT C SECTION W TUBAL LIGATION  5/7/2017    Procedure: REPEAT C SECTION WITH TUBAL LIGATION;  Surgeon: Nadine Dobbs M.D.;  Location: LABOR AND DELIVERY;  Service:    • PRIMARY C SECTION  8/5/2015    Procedure: PRIMARY C SECTION;  Surgeon: Patti Shah M.D.;  Location: LABOR AND DELIVERY;  Service:        Fam Hx:    family history includes No Known Problems in her father and sister.  Family Status   Relation Status   • Mother Alive   • Father Alive   • Sister Alive   • Son Alive       Soc HX:  Social History     Social History   • Marital status:      Spouse name: N/A   • Number of children: 1   • Years of education: N/A     Occupational History   • /[employer] with [employer]     Social History Main Topics   • Smoking status: Never Smoker   • Smokeless tobacco: Never Used   • Alcohol use No      Comment: none now    • Drug use: No   • Sexual activity: Yes     Partners: Male     Other Topics Concern   • Not on file     Social History Narrative   • No narrative on file         Medications:    Current Outpatient Prescriptions:   •  nitrofurantoin monohydr macro (MACROBID) 100 MG Cap, Take  "1 Cap by mouth every 12 hours for 5 days., Disp: 10 Cap, Rfl: 0      Allergies:  Sulfa drugs    ROS  Review of Systems   Constitutional: Negative for fever, chills and malaise/fatigue.   HENT: Negative for congestion and sore throat.    Eyes: Negative for blurred vision, double vision and photophobia.   Respiratory: Negative for cough and shortness of breath.    Cardiovascular: Negative for chest pain and palpitations.   Gastrointestinal: Negative for nausea, vomiting, abdominal pain, diarrhea and constipation.   Genitourinary: Positive for dysuria, urgency and frequency.   Musculoskeletal: Negative for joint pain and myalgias.   Skin: Negative for rash.   Neurological: Negative for dizziness, tingling and headaches.   Endo/Heme/Allergies: Does not bruise/bleed easily.   Psychiatric/Behavioral: Negative for depression. The patient is not nervous/anxious.           Objective:     /70   Pulse 62   Temp 36.4 °C (97.6 °F)   Resp 20   Ht 1.6 m (5' 3\")   Wt 52.2 kg (115 lb)   SpO2 97%   BMI 20.37 kg/m²      Physical Exam   Physical Exam   Constitutional: She is oriented to person, place, and time. She appears well-developed and well-nourished. No distress.   HENT:   Head: Normocephalic and atraumatic.   Right Ear: External ear normal.   Left Ear: External ear normal.   Nose: Nose normal.   Mouth/Throat: Oropharynx is clear and moist. No oropharyngeal exudate.   Eyes: Conjunctivae normal and EOM are normal. Pupils are equal, round, and reactive to light.   Neck: Normal range of motion. Neck supple. No thyromegaly present.   Cardiovascular: Normal rate, regular rhythm, normal heart sounds and intact distal pulses.  Exam reveals no gallop and no friction rub.    No murmur heard.  Pulmonary/Chest: Effort normal and breath sounds normal. No respiratory distress. She has no wheezes. She has no rales. She exhibits no tenderness.   Abdominal: Soft. Bowel sounds are normal. She exhibits no distension and no mass. " There is no tenderness. There is no rebound and no guarding.   Genitourinary:        Pt deferred   Musculoskeletal: Normal range of motion. She exhibits no edema and no tenderness.   Lymphadenopathy:     She has no cervical adenopathy.   Neurological: She is alert and oriented to person, place, and time. She has normal reflexes. No cranial nerve deficit.   Skin: Skin is warm and dry. No rash noted. No erythema.   Psychiatric: She has a normal mood and affect. Her behavior is normal. Judgment and thought content normal.               Assessment/Plan:     1. Acute cystitis with hematuria    - POCT Urinalysis-->LEUKS AND BLOOD  - Urine Culture; Future  - nitrofurantoin monohydr macro (MACROBID) 100 MG Cap; Take 1 Cap by mouth every 12 hours for 5 days.  Dispense: 10 Cap; Refill: 0    AZO discussed for dysuria  Probiotics discussed  Cranberry tablets discussed  Rest, fluids encouraged.  AVS with medical info given.  Pt was in full understanding and agreement with the plan.  Follow-up as needed if symptoms worsen or fail to improve.

## 2017-10-27 NOTE — PATIENT INSTRUCTIONS
Urinary Tract Infection  A urinary tract infection (UTI) can occur any place along the urinary tract. The tract includes the kidneys, ureters, bladder, and urethra. A type of germ called bacteria often causes a UTI. UTIs are often helped with antibiotic medicine.   HOME CARE   · If given, take antibiotics as told by your doctor. Finish them even if you start to feel better.  · Drink enough fluids to keep your pee (urine) clear or pale yellow.  · Avoid tea, drinks with caffeine, and bubbly (carbonated) drinks.  · Pee often. Avoid holding your pee in for a long time.  · Pee before and after having sex (intercourse).  · Wipe from front to back after you poop (bowel movement) if you are a woman. Use each tissue only once.  GET HELP RIGHT AWAY IF:   · You have back pain.  · You have lower belly (abdominal) pain.  · You have chills.  · You feel sick to your stomach (nauseous).  · You throw up (vomit).  · Your burning or discomfort with peeing does not go away.  · You have a fever.  · Your symptoms are not better in 3 days.  MAKE SURE YOU:   · Understand these instructions.  · Will watch your condition.  · Will get help right away if you are not doing well or get worse.     This information is not intended to replace advice given to you by your health care provider. Make sure you discuss any questions you have with your health care provider.     Document Released: 06/05/2009 Document Revised: 01/08/2016 Document Reviewed: 07/18/2013  [employer] Interactive Patient Education ©2016 [employer]

## 2017-10-29 LAB
BACTERIA UR CULT: ABNORMAL
SIGNIFICANT IND 70042: ABNORMAL
SOURCE SOURCE: ABNORMAL

## 2017-10-30 ENCOUNTER — TELEPHONE (OUTPATIENT)
Dept: URGENT CARE | Facility: CLINIC | Age: 32
End: 2017-10-30

## 2017-10-30 NOTE — TELEPHONE ENCOUNTER
Called and left message with pt about urine culture results which came back positive for E.coli.   I told her she could continue the abx therapy with a positive urine culture which was sensitive to the abx she was placed on.  Encouraged Pt to call back with questions.  Mario Nascimento PA-C

## 2017-10-31 ENCOUNTER — SUPERVISING PHYSICIAN REVIEW (OUTPATIENT)
Dept: URGENT CARE | Facility: CLINIC | Age: 32
End: 2017-10-31

## 2017-12-07 DIAGNOSIS — R53.81 MALAISE AND FATIGUE: ICD-10-CM

## 2017-12-07 DIAGNOSIS — R53.83 MALAISE AND FATIGUE: ICD-10-CM

## 2017-12-14 ENCOUNTER — HOSPITAL ENCOUNTER (OUTPATIENT)
Dept: LAB | Facility: MEDICAL CENTER | Age: 32
End: 2017-12-14
Attending: NURSE PRACTITIONER
Payer: COMMERCIAL

## 2017-12-14 DIAGNOSIS — R53.83 MALAISE AND FATIGUE: ICD-10-CM

## 2017-12-14 DIAGNOSIS — R53.81 MALAISE AND FATIGUE: ICD-10-CM

## 2017-12-14 LAB
25(OH)D3 SERPL-MCNC: 19 NG/ML (ref 30–100)
TSH SERPL DL<=0.005 MIU/L-ACNC: 1.5 UIU/ML (ref 0.38–5.33)

## 2017-12-14 PROCEDURE — 82306 VITAMIN D 25 HYDROXY: CPT

## 2017-12-14 PROCEDURE — 36415 COLL VENOUS BLD VENIPUNCTURE: CPT

## 2017-12-14 PROCEDURE — 84443 ASSAY THYROID STIM HORMONE: CPT

## 2017-12-19 DIAGNOSIS — E55.9 VITAMIN D DEFICIENCY: ICD-10-CM

## 2017-12-19 RX ORDER — ERGOCALCIFEROL 1.25 MG/1
50000 CAPSULE ORAL
Qty: 12 CAP | Refills: 0 | Status: SHIPPED | OUTPATIENT
Start: 2017-12-19 | End: 2019-10-09

## 2018-04-05 ENCOUNTER — APPOINTMENT (OUTPATIENT)
Dept: ADMISSIONS | Facility: MEDICAL CENTER | Age: 33
End: 2018-04-05
Attending: SPECIALIST
Payer: COMMERCIAL

## 2018-04-10 ENCOUNTER — HOSPITAL ENCOUNTER (OUTPATIENT)
Facility: MEDICAL CENTER | Age: 33
End: 2018-04-10
Attending: SPECIALIST | Admitting: SPECIALIST
Payer: COMMERCIAL

## 2018-04-10 VITALS
DIASTOLIC BLOOD PRESSURE: 74 MMHG | BODY MASS INDEX: 20.23 KG/M2 | SYSTOLIC BLOOD PRESSURE: 118 MMHG | TEMPERATURE: 98.1 F | WEIGHT: 114.2 LBS | HEART RATE: 68 BPM | RESPIRATION RATE: 14 BRPM | OXYGEN SATURATION: 97 %

## 2018-04-10 PROBLEM — Z41.1 ENCOUNTER FOR COSMETIC SURGERY: Status: ACTIVE | Noted: 2018-04-10

## 2018-04-10 LAB
B-HCG FREE SERPL-ACNC: <5 MIU/ML
IHCGL IHCGL: NEGATIVE MIU/ML

## 2018-04-10 PROCEDURE — A9270 NON-COVERED ITEM OR SERVICE: HCPCS

## 2018-04-10 PROCEDURE — 500122 HCHG BOVIE, BLADE: Performed by: SPECIALIST

## 2018-04-10 PROCEDURE — 700105 HCHG RX REV CODE 258: Performed by: SPECIALIST

## 2018-04-10 PROCEDURE — 160009 HCHG ANES TIME/MIN: Performed by: SPECIALIST

## 2018-04-10 PROCEDURE — 700101 HCHG RX REV CODE 250

## 2018-04-10 PROCEDURE — 501838 HCHG SUTURE GENERAL: Performed by: SPECIALIST

## 2018-04-10 PROCEDURE — 502575 HCHG PACK, BREAST: Performed by: SPECIALIST

## 2018-04-10 PROCEDURE — 84702 CHORIONIC GONADOTROPIN TEST: CPT

## 2018-04-10 PROCEDURE — 500817 HCHG MAMMARY SIZER: Performed by: SPECIALIST

## 2018-04-10 PROCEDURE — 160035 HCHG PACU - 1ST 60 MINS PHASE I: Performed by: SPECIALIST

## 2018-04-10 PROCEDURE — 160048 HCHG OR STATISTICAL LEVEL 1-5: Performed by: SPECIALIST

## 2018-04-10 PROCEDURE — 160002 HCHG RECOVERY MINUTES (STAT): Performed by: SPECIALIST

## 2018-04-10 PROCEDURE — 700111 HCHG RX REV CODE 636 W/ 250 OVERRIDE (IP)

## 2018-04-10 PROCEDURE — 700102 HCHG RX REV CODE 250 W/ 637 OVERRIDE(OP)

## 2018-04-10 PROCEDURE — 160046 HCHG PACU - 1ST 60 MINS PHASE II: Performed by: SPECIALIST

## 2018-04-10 PROCEDURE — 160025 RECOVERY II MINUTES (STATS): Performed by: SPECIALIST

## 2018-04-10 PROCEDURE — 160028 HCHG SURGERY MINUTES - 1ST 30 MINS LEVEL 3: Performed by: SPECIALIST

## 2018-04-10 RX ORDER — OXYCODONE HCL 5 MG/5 ML
SOLUTION, ORAL ORAL
Status: COMPLETED
Start: 2018-04-10 | End: 2018-04-10

## 2018-04-10 RX ORDER — GENTAMICIN SULFATE 40 MG/ML
INJECTION, SOLUTION INTRAMUSCULAR; INTRAVENOUS
Status: DISCONTINUED | OUTPATIENT
Start: 2018-04-10 | End: 2018-04-10 | Stop reason: HOSPADM

## 2018-04-10 RX ORDER — BUPIVACAINE HYDROCHLORIDE AND EPINEPHRINE 2.5; 5 MG/ML; UG/ML
INJECTION, SOLUTION EPIDURAL; INFILTRATION; INTRACAUDAL; PERINEURAL
Status: DISCONTINUED | OUTPATIENT
Start: 2018-04-10 | End: 2018-04-10 | Stop reason: HOSPADM

## 2018-04-10 RX ORDER — BACITRACIN 50000 [IU]/1
INJECTION, POWDER, FOR SOLUTION INTRAMUSCULAR
Status: DISCONTINUED | OUTPATIENT
Start: 2018-04-10 | End: 2018-04-10 | Stop reason: HOSPADM

## 2018-04-10 RX ORDER — MEPERIDINE HYDROCHLORIDE 25 MG/ML
INJECTION INTRAMUSCULAR; INTRAVENOUS; SUBCUTANEOUS
Status: COMPLETED
Start: 2018-04-10 | End: 2018-04-10

## 2018-04-10 RX ORDER — LIDOCAINE HYDROCHLORIDE 10 MG/ML
INJECTION, SOLUTION INFILTRATION; PERINEURAL
Status: COMPLETED
Start: 2018-04-10 | End: 2018-04-10

## 2018-04-10 RX ORDER — SODIUM CHLORIDE, SODIUM LACTATE, POTASSIUM CHLORIDE, CALCIUM CHLORIDE 600; 310; 30; 20 MG/100ML; MG/100ML; MG/100ML; MG/100ML
INJECTION, SOLUTION INTRAVENOUS
Status: DISCONTINUED | OUTPATIENT
Start: 2018-04-10 | End: 2018-04-10 | Stop reason: HOSPADM

## 2018-04-10 RX ADMIN — OXYCODONE HYDROCHLORIDE 10 MG: 5 SOLUTION ORAL at 13:38

## 2018-04-10 RX ADMIN — MEPERIDINE HYDROCHLORIDE 12.5 MG: 25 INJECTION INTRAMUSCULAR; INTRAVENOUS; SUBCUTANEOUS at 14:00

## 2018-04-10 RX ADMIN — SODIUM CHLORIDE, POTASSIUM CHLORIDE, SODIUM LACTATE AND CALCIUM CHLORIDE: 600; 310; 30; 20 INJECTION, SOLUTION INTRAVENOUS at 10:00

## 2018-04-10 RX ADMIN — LIDOCAINE HYDROCHLORIDE 0.1 ML: 10 INJECTION, SOLUTION INFILTRATION; PERINEURAL at 10:45

## 2018-04-10 RX ADMIN — MEPERIDINE HYDROCHLORIDE 12.5 MG: 25 INJECTION INTRAMUSCULAR; INTRAVENOUS; SUBCUTANEOUS at 13:50

## 2018-04-10 ASSESSMENT — PAIN SCALES - GENERAL
PAINLEVEL_OUTOF10: ASSUMED PAIN PRESENT
PAINLEVEL_OUTOF10: 3
PAINLEVEL_OUTOF10: 3

## 2018-04-10 NOTE — OR SURGEON
Immediate Post OP Note    PreOp Diagnosis: hypomastia    PostOp Diagnosis: same    Procedure(s):  MAMMOPLASTY AUGMENTATION - Wound Class: Clean    Surgeon(s):  Aubrey Crowder M.D.    Anesthesiologist/Type of Anesthesia:  Anesthesiologist: Chris Walker M.D./General    Surgical Staff:  Circulator: Jazmin Murillo R.N.  Scrub Person: Skylar Pate    Specimens removed if any:  * No specimens in log *    Estimated Blood Loss: 75cc    Findings:     Complications: none        4/10/2018 1:32 PM Aubrey Crowder M.D.

## 2018-04-10 NOTE — OP REPORT
DATE OF SERVICE:  04/10/2018    PREOPERATIVE DIAGNOSES:  1.  Bilateral breast hypomastia.  2.  Breast asymmetry.    POSTOPERATIVE DIAGNOSES:  1.  Bilateral breast hypomastia.  2.  Breast asymmetry.    OPERATIVE PROCEDURE:  Bilateral breast augmentation (Allergan SRM gel implants   520 mL bilateral).    SURGEON:  Aubrey Crowder MD    ANESTHESIOLOGIST:  Chris Walker MD    ANESTHESIA:  General endotracheal tube.    COMPLICATIONS:  None.    ESTIMATED BLOOD LOSS:  Less than 75 mL.    INDICATIONS:  Patient is a 36-year-old female who desires bilateral breast   augmentation.  Risks and benefits of the procedure have been explained in full   including infection, bleeding, capsulation, deflation, asymmetries, nerve   damage, rippling on sides, possible decreased cancer detection, and possible   inability to breastfeed.  Patient is aware of the risks and wished to proceed.    FINDINGS:  As above.    DESCRIPTION OF PROCEDURE:  The patient was preoperatively marked in the   holding room in a standing position.  She was taken to operating theater where   general anesthesia was induced.  Ancef 2 g was given prior to starting the   procedure.  Starting on the right side, I injected 10 mL of 0.25% Marcaine   with epinephrine.  This was also done on the left breast.  I then used a 15   blade to make an incision in the inframammary area and dissected down to the   breast tissue to identify the lateral edge of the pectoralis major muscle.    Submuscular pocket was developed.  Hemostasis was obtained with   electrocautery.  The pocket was irrigated with normal saline solution with   triple antibiotic added.  I injected 20 mL of 0.25% Marcaine with epinephrine   in the pectoralis major muscle for postoperative pain control.  A saline sizer   was placed in this right pocket and filled to 520 mL.  This gave the patient   a nice look and filled up the pocket nicely.  Once I was satisfied with that,   I went over to the left side, did  a similar dissection and once we had both   submuscular pockets developed, I took a saline sizer and placed it on the left   side, filled up to 520 mL.  I then opened an Allergan SRM gel implant 520 mL.    This was placed in the submuscular position on the right side through a   Kumar funnel.  Patient was sat up and pocket adjustments were made.  There   was definitely chest wall asymmetry.  Prior to surgery, the patient felt that   the same size implant, which she would like to be rather than have 2 different   size implants and I felt that the symmetry was close enough that we could get   away with the same size implant.  So, once I had placed the implant on the   right side, I took out the saline sizer from the left, opened another 520 mL   Allergan gel implant style SRM.  This was placed in the submuscular position   through a Kumar funnel once again and then the patient was sat up one more   time, I made minor pocket adjustments.  Once I was satisfied with symmetry,   the patient was laid back down.  The pockets were irrigated.  The incision was   closed with 3-0 Vicryl suture in a multilayered fashion and then incision was   closed with a 3-0 Stratafix suture subcuticular stitch double layer closure.    Steri-Strips were applied to the incision.  Two-inch foam tape was placed   around the breast and she was placed in a comfort supportive bra.  I wrapped   her with a Shur-Band Ace wrap for compression.  She tolerated the procedure   well and was extubated in the operating room, fully awake with protected   airway.       ____________________________________     MD CARLA PALENCIA / MINISTERIO    DD:  04/10/2018 13:41:53  DT:  04/10/2018 14:08:30    D#:  [id number]  Job#:  [id number]

## 2018-04-10 NOTE — OR NURSING
1410 Upon disconnecting patient from monitor etc. C/o pain, rx given. 1412 Pt reports immediate improvement. VSS. 1420 Remains comfortable, pt transferrred to stage 2.

## 2018-04-10 NOTE — OR NURSING
Patient to preop, allergies and NPO status verified, patient reports she drank about 3/4 of a bottle of water right before preop, Anesthesia notified, home medications reconciled, belongings secured, verbalizes understanding of pain scale, surgical site verified, IV access established, SCDs in place.

## 2018-04-10 NOTE — DISCHARGE INSTRUCTIONS
ACTIVITY: Rest and take it easy for the first 24 hours.  A responsible adult is recommended to remain with you during that time.  It is normal to feel sleepy.  We encourage you to not do anything that requires balance, judgment or coordination.    MILD FLU-LIKE SYMPTOMS ARE NORMAL. YOU MAY EXPERIENCE GENERALIZED MUSCLE ACHES, THROAT IRRITATION, HEADACHE AND/OR SOME NAUSEA.    FOR 24 HOURS DO NOT:  Drive, operate machinery or run household appliances.  Drink beer or alcoholic beverages.   Make important decisions or sign legal documents.    SPECIAL INSTRUCTIONS: *Keep head elevated at least 30 at all times. Up ad mariposa as needed.  **    DIET: To avoid nausea, slowly advance diet as tolerated, avoiding spicy or greasy foods for the first day.  Add more substantial food to your diet according to your physician's instructions.  INCREASE FLUIDS AND FIBER TO AVOID CONSTIPATION.    SURGICAL DRESSING/BATHING: *Remove ace wrap in 48 hours, you may shower in 48 hours. Start antibiotic at 8:30PM**    FOLLOW-UP APPOINTMENT:  A follow-up appointment should be arranged with your doctor; call to schedule.    You should CALL YOUR PHYSICIAN if you develop:  Fever greater than 101 degrees F.  Pain not relieved by medication, or persistent nausea or vomiting.  Excessive bleeding (blood soaking through dressing) or unexpected drainage from the wound.  Extreme redness or swelling around the incision site, drainage of pus or foul smelling drainage.  Inability to urinate or empty your bladder within 8 hours.  Problems with breathing or chest pain.    You should call 911 if you develop problems with breathing or chest pain.  If you are unable to contact your doctor or surgical center, you should go to the nearest emergency room or urgent care center.  Physician's telephone #: [phone]    If any questions arise, call your doctor.  If your doctor is not available, please feel free to call the Surgical Center at [phone].  The Center is  open Monday through Friday from 7AM to 7PM.  You can also call the HEALTH HOTLINE open 24 hours/day, 7 days/week and speak to a nurse at [phone], or toll free at [phone].    A registered nurse may call you a few days after your surgery to see how you are doing after your procedure.    MEDICATIONS: Resume taking daily medication.  Take prescribed pain medication with food.  If no medication is prescribed, you may take non-aspirin pain medication if needed.  PAIN MEDICATION CAN BE VERY CONSTIPATING.  Take a stool softener or laxative such as senokot, pericolace, or milk of magnesia if needed.    Prescription given for and filled at home**.  Last pain medication given at *Oxycodone at 1:45PM**.    If your physician has prescribed pain medication that includes Acetaminophen (Tylenol), do not take additional Acetaminophen (Tylenol) while taking the prescribed medication.    Depression / Suicide Risk    As you are discharged from this [facility] facility, it is important to learn how to keep safe from harming yourself.    Recognize the warning signs:  · Abrupt changes in personality, positive or negative- including increase in energy   · Giving away possessions  · Change in eating patterns- significant weight changes-  positive or negative  · Change in sleeping patterns- unable to sleep or sleeping all the time   · Unwillingness or inability to communicate  · Depression  · Unusual sadness, discouragement and loneliness  · Talk of wanting to die  · Neglect of personal appearance   · Rebelliousness- reckless behavior  · Withdrawal from people/activities they love  · Confusion- inability to concentrate     If you or a loved one observes any of these behaviors or has concerns about self-harm, here's what you can do:  · Talk about it- your feelings and reasons for harming yourself  · Remove any means that you might use to hurt yourself (examples: pills, rope, extension cords, firearm)  · Get professional help  from the community (Mental Health, Substance Abuse, psychological counseling)  · Do not be alone:Call your Safe Contact- someone whom you trust who will be there for you.  · Call your local CRISIS HOTLINE 552-1421 or 852-865-9325  · Call your local Children's Mobile Crisis Response Team [place] (464) 229-3488 or www.SourceClear  · Call the toll free National Suicide Prevention Hotlines   · National Suicide Prevention Lifeline 356-869-OQUZ (4298)  · National Hope Line Network 800-SUICIDE (549-8573)

## 2018-04-10 NOTE — OR NURSING
1445 Followed patient to stage 2 to assist with discharge. Dressing remains C,D,I, pt able to move arms bilat freely. Discharge instructions discussed in detail with sister Valentin including importance of not lying flat and staying elevated at least 30 degrees. Pain tolerable with no nausea. Pt meets criteria for discharge. 1450 Pt discharged via w/c at this time.

## 2019-03-15 ENCOUNTER — OFFICE VISIT (OUTPATIENT)
Dept: URGENT CARE | Facility: PHYSICIAN GROUP | Age: 34
End: 2019-03-15
Payer: COMMERCIAL

## 2019-03-15 ENCOUNTER — HOSPITAL ENCOUNTER (OUTPATIENT)
Facility: MEDICAL CENTER | Age: 34
End: 2019-03-15
Attending: PHYSICIAN ASSISTANT
Payer: COMMERCIAL

## 2019-03-15 VITALS
SYSTOLIC BLOOD PRESSURE: 110 MMHG | OXYGEN SATURATION: 98 % | HEART RATE: 61 BPM | WEIGHT: 125.4 LBS | HEIGHT: 64 IN | BODY MASS INDEX: 21.41 KG/M2 | TEMPERATURE: 98.5 F | DIASTOLIC BLOOD PRESSURE: 68 MMHG

## 2019-03-15 DIAGNOSIS — N89.8 VAGINAL DISCHARGE: ICD-10-CM

## 2019-03-15 DIAGNOSIS — R30.0 DYSURIA: ICD-10-CM

## 2019-03-15 DIAGNOSIS — N30.00 ACUTE CYSTITIS WITHOUT HEMATURIA: ICD-10-CM

## 2019-03-15 LAB
APPEARANCE UR: NORMAL
BILIRUB UR STRIP-MCNC: NEGATIVE MG/DL
CANDIDA DNA VAG QL PROBE+SIG AMP: NEGATIVE
COLOR UR AUTO: YELLOW
G VAGINALIS DNA VAG QL PROBE+SIG AMP: POSITIVE
GLUCOSE UR STRIP.AUTO-MCNC: NEGATIVE MG/DL
INT CON NEG: NEGATIVE
INT CON POS: POSITIVE
KETONES UR STRIP.AUTO-MCNC: NEGATIVE MG/DL
LEUKOCYTE ESTERASE UR QL STRIP.AUTO: NORMAL
NITRITE UR QL STRIP.AUTO: NEGATIVE
PH UR STRIP.AUTO: 6.5 [PH] (ref 5–8)
POC URINE PREGNANCY TEST: NEGATIVE
PROT UR QL STRIP: NEGATIVE MG/DL
RBC UR QL AUTO: NORMAL
SP GR UR STRIP.AUTO: 1.01
T VAGINALIS DNA VAG QL PROBE+SIG AMP: NEGATIVE
UROBILINOGEN UR STRIP-MCNC: 0.2 MG/DL

## 2019-03-15 PROCEDURE — 87510 GARDNER VAG DNA DIR PROBE: CPT

## 2019-03-15 PROCEDURE — 81025 URINE PREGNANCY TEST: CPT | Performed by: PHYSICIAN ASSISTANT

## 2019-03-15 PROCEDURE — 99214 OFFICE O/P EST MOD 30 MIN: CPT | Performed by: PHYSICIAN ASSISTANT

## 2019-03-15 PROCEDURE — 87480 CANDIDA DNA DIR PROBE: CPT

## 2019-03-15 PROCEDURE — 87660 TRICHOMONAS VAGIN DIR PROBE: CPT

## 2019-03-15 PROCEDURE — 81002 URINALYSIS NONAUTO W/O SCOPE: CPT | Performed by: PHYSICIAN ASSISTANT

## 2019-03-15 RX ORDER — NITROFURANTOIN 25; 75 MG/1; MG/1
100 CAPSULE ORAL EVERY 12 HOURS
Qty: 10 CAP | Refills: 0 | Status: SHIPPED | OUTPATIENT
Start: 2019-03-15 | End: 2019-03-20

## 2019-03-15 ASSESSMENT — ENCOUNTER SYMPTOMS
CARDIOVASCULAR NEGATIVE: 1
VOMITING: 0
ABDOMINAL PAIN: 0
FLANK PAIN: 0
CONSTITUTIONAL NEGATIVE: 1
NAUSEA: 0
RESPIRATORY NEGATIVE: 1
DIARRHEA: 0

## 2019-03-15 NOTE — PROGRESS NOTES
Subjective:      Nadine Fink is a 33 y.o. female who presents with UTI (burning sensation, odor, discharge, x5 days )            Dysuria    This is a new problem. The current episode started in the past 7 days. The problem occurs every urination. The problem has been gradually worsening. The quality of the pain is described as burning. There has been no fever. The fever has been present for less than 1 day. There is no history of pyelonephritis. Associated symptoms include urgency. Pertinent negatives include no discharge, flank pain, frequency, hematuria, nausea or vomiting. Associated symptoms comments: Cloudy malodorous. She has tried nothing for the symptoms. The treatment provided no relief. There is no history of kidney stones or recurrent UTIs.   Also having a clear vaginal discharge.  Did just finished her menstrual cycle.  Denies vaginal pain or irritation.     PMH:  has a past medical history of Dental disorder.  MEDS:   Current Outpatient Prescriptions:   •  nitrofurantoin monohydr macro (MACROBID) 100 MG Cap, Take 1 Cap by mouth every 12 hours for 5 days., Disp: 10 Cap, Rfl: 0  •  vitamin D, Ergocalciferol, (DRISDOL) 48187 units Cap capsule, Take 1 Cap by mouth every 7 days. Labs every three months, do not refill until after labs., Disp: 12 Cap, Rfl: 0  ALLERGIES:   Allergies   Allergen Reactions   • Sulfa Drugs Itching     SURGHX:   Past Surgical History:   Procedure Laterality Date   • MAMMOPLASTY AUGMENTATION Bilateral 4/10/2018    Procedure: MAMMOPLASTY AUGMENTATION;  Surgeon: Aubrey Crowder M.D.;  Location: SURGERY [facility];  Service: Plastics   • REPEAT C SECTION W TUBAL LIGATION  5/7/2017    Procedure: REPEAT C SECTION WITH TUBAL LIGATION;  Surgeon: Nadine Dobbs M.D.;  Location: LABOR AND DELIVERY;  Service:    • PRIMARY C SECTION  8/5/2015    Procedure: PRIMARY C SECTION;  Surgeon: Patti Shah M.D.;  Location: LABOR AND DELIVERY;  Service:    • DENTAL SURGERY  2009  "   oral surgeon \"sinus lift and bone graft\"     SOCHX:  reports that she has never smoked. She has never used smokeless tobacco. She reports that she drinks alcohol. She reports that she does not use drugs.  FH: family history includes No Known Problems in her father and sister.    Review of Systems   Constitutional: Negative.    Respiratory: Negative.    Cardiovascular: Negative.    Gastrointestinal: Negative for abdominal pain, diarrhea, nausea and vomiting.   Genitourinary: Positive for dysuria and urgency. Negative for flank pain, frequency and hematuria.       Medications, Allergies, and current problem list reviewed today in Epic     Objective:     /68 (BP Location: Left arm, Patient Position: Sitting, BP Cuff Size: Adult)   Pulse 61   Temp 36.9 °C (98.5 °F) (Temporal)   Ht 1.626 m (5' 4\")   Wt 56.9 kg (125 lb 6.4 oz)   SpO2 98%   BMI 21.52 kg/m²      Physical Exam   Constitutional: She is oriented to person, place, and time. She appears well-developed and well-nourished. No distress.   HENT:   Head: Normocephalic and atraumatic.   Eyes: Conjunctivae are normal.   Neck: Normal range of motion. Neck supple.   Cardiovascular: Normal rate, regular rhythm and normal heart sounds.    Pulmonary/Chest: Effort normal and breath sounds normal. No respiratory distress. She has no wheezes. She has no rales.   Abdominal: Soft. She exhibits no distension. There is no tenderness.   Genitourinary:   Genitourinary Comments: Self swab   Neurological: She is alert and oriented to person, place, and time.   Skin: Skin is warm and dry. She is not diaphoretic.   Psychiatric: She has a normal mood and affect. Her behavior is normal. Judgment and thought content normal.   Nursing note and vitals reviewed.              Assessment/Plan:     1. Acute cystitis without hematuria  nitrofurantoin monohydr macro (MACROBID) 100 MG Cap   2. Dysuria  POCT Urinalysis    POCT PREGNANCY   3. Vaginal discharge  VAGINAL PATHOGENS DNA " PANEL     Urinalysis: Positive leukocytes  Pregnancy: Negative    Appears to be lower urinary tract infection due to frequency and burning.  She did have clear discharge and some malodor.  Therefore patient will self swab for vaginitis.  I will call her with results and treat accordingly if needed.    OTC meds and conservative measures as discussed    Return to clinic or go to ED if symptoms worsen or persist. Indications for ED discussed at length. Patient voices understanding. Follow-up with your primary care provider in 3-5 days. Red flags discussed. All side effects of medication discussed including allergic response, GI upset, tendon injury, etc.    Please note that this dictation was created using voice recognition software. I have made every reasonable attempt to correct obvious errors, but I expect that there are errors of grammar and possibly content that I did not discover before finalizing the note.

## 2019-03-16 DIAGNOSIS — N76.0 BACTERIAL VAGINOSIS: ICD-10-CM

## 2019-03-16 DIAGNOSIS — B96.89 BACTERIAL VAGINOSIS: ICD-10-CM

## 2019-03-16 RX ORDER — METRONIDAZOLE 500 MG/1
500 TABLET ORAL 2 TIMES DAILY
Qty: 14 TAB | Refills: 0 | Status: SHIPPED | OUTPATIENT
Start: 2019-03-16 | End: 2019-03-23

## 2019-08-05 ENCOUNTER — TELEPHONE (OUTPATIENT)
Dept: SCHEDULING | Facility: IMAGING CENTER | Age: 34
End: 2019-08-05

## 2019-09-24 ENCOUNTER — OFFICE VISIT (OUTPATIENT)
Dept: URGENT CARE | Facility: PHYSICIAN GROUP | Age: 34
End: 2019-09-24
Payer: COMMERCIAL

## 2019-09-24 VITALS
TEMPERATURE: 98.6 F | WEIGHT: 125.2 LBS | HEART RATE: 69 BPM | DIASTOLIC BLOOD PRESSURE: 72 MMHG | RESPIRATION RATE: 16 BRPM | BODY MASS INDEX: 21.37 KG/M2 | SYSTOLIC BLOOD PRESSURE: 116 MMHG | HEIGHT: 64 IN | OXYGEN SATURATION: 96 %

## 2019-09-24 DIAGNOSIS — R30.0 DYSURIA: ICD-10-CM

## 2019-09-24 LAB
APPEARANCE UR: NORMAL
BILIRUB UR STRIP-MCNC: NORMAL MG/DL
COLOR UR AUTO: NORMAL
GLUCOSE UR STRIP.AUTO-MCNC: NORMAL MG/DL
INT CON NEG: NEGATIVE
INT CON POS: POSITIVE
KETONES UR STRIP.AUTO-MCNC: NORMAL MG/DL
LEUKOCYTE ESTERASE UR QL STRIP.AUTO: NORMAL
NITRITE UR QL STRIP.AUTO: NORMAL
PH UR STRIP.AUTO: 6.5 [PH] (ref 5–8)
POC URINE PREGNANCY TEST: NORMAL
PROT UR QL STRIP: NORMAL MG/DL
RBC UR QL AUTO: NORMAL
SP GR UR STRIP.AUTO: <1.005
UROBILINOGEN UR STRIP-MCNC: 0.2 MG/DL

## 2019-09-24 PROCEDURE — 81025 URINE PREGNANCY TEST: CPT | Performed by: PHYSICIAN ASSISTANT

## 2019-09-24 PROCEDURE — 81002 URINALYSIS NONAUTO W/O SCOPE: CPT | Performed by: PHYSICIAN ASSISTANT

## 2019-09-24 PROCEDURE — 99214 OFFICE O/P EST MOD 30 MIN: CPT | Performed by: PHYSICIAN ASSISTANT

## 2019-09-24 RX ORDER — NITROFURANTOIN 25; 75 MG/1; MG/1
100 CAPSULE ORAL EVERY 12 HOURS
Qty: 10 CAP | Refills: 0 | Status: SHIPPED | OUTPATIENT
Start: 2019-09-24 | End: 2020-03-19 | Stop reason: SDUPTHER

## 2019-09-24 ASSESSMENT — ENCOUNTER SYMPTOMS
COUGH: 0
FLANK PAIN: 0
BACK PAIN: 0
SHORTNESS OF BREATH: 0
CHILLS: 0
PALPITATIONS: 0
VOMITING: 0
FEVER: 0
NAUSEA: 0
ABDOMINAL PAIN: 0

## 2019-09-24 NOTE — PROGRESS NOTES
Subjective:      Nadine Fink is a 34 y.o. female who presents with Painful Urination (urgency, frequency, X 1 week )            UTI   This is a new problem. The current episode started in the past 7 days. The problem occurs constantly. The problem has been unchanged. Associated symptoms include urinary symptoms. Pertinent negatives include no abdominal pain, chest pain, chills, coughing, fever, nausea or vomiting. Nothing aggravates the symptoms. She has tried nothing for the symptoms.       Review of Systems   Constitutional: Negative for chills and fever.   Respiratory: Negative for cough and shortness of breath.    Cardiovascular: Negative for chest pain and palpitations.   Gastrointestinal: Negative for abdominal pain, nausea and vomiting.   Genitourinary: Positive for dysuria, frequency and urgency. Negative for flank pain and hematuria.   Musculoskeletal: Negative for back pain.   All other systems reviewed and are negative.    PMH:  has a past medical history of Dental disorder.  MEDS:   Current Outpatient Medications:   •  nitrofurantoin monohyd macro (MACROBID) 100 MG Cap, Take 1 Cap by mouth every 12 hours for 5 days., Disp: 10 Cap, Rfl: 0  •  vitamin D, Ergocalciferol, (DRISDOL) 46129 units Cap capsule, Take 1 Cap by mouth every 7 days. Labs every three months, do not refill until after labs., Disp: 12 Cap, Rfl: 0  ALLERGIES:   Allergies   Allergen Reactions   • Sulfa Drugs Itching     SURGHX:   Past Surgical History:   Procedure Laterality Date   • MAMMOPLASTY AUGMENTATION Bilateral 4/10/2018    Procedure: MAMMOPLASTY AUGMENTATION;  Surgeon: Aubrey Crowder M.D.;  Location: SURGERY [facility];  Service: Plastics   • REPEAT C SECTION W TUBAL LIGATION  5/7/2017    Procedure: REPEAT C SECTION WITH TUBAL LIGATION;  Surgeon: Nadine Dobbs M.D.;  Location: LABOR AND DELIVERY;  Service:    • PRIMARY C SECTION  8/5/2015    Procedure: PRIMARY C SECTION;  Surgeon: Patti Shah M.D.;   "Location: LABOR AND DELIVERY;  Service:    • DENTAL SURGERY  2009    oral surgeon \"sinus lift and bone graft\"     SOCHX:  reports that she has never smoked. She has never used smokeless tobacco. She reports that she drinks alcohol. She reports that she does not use drugs.  FH: Family history was reviewed, no pertinent findings to report  Medications, Allergies, and current problem list reviewed today in Epic       Objective:     Blood Pressure 116/72   Pulse 69   Temperature 37 °C (98.6 °F)   Respiration 16   Height 1.626 m (5' 4\")   Weight 56.8 kg (125 lb 3.2 oz)   Oxygen Saturation 96%   Body Mass Index 21.49 kg/m²      Physical Exam   Constitutional: She is oriented to person, place, and time. She appears well-developed and well-nourished.   HENT:   Head: Normocephalic and atraumatic.   Right Ear: External ear normal.   Left Ear: External ear normal.   Nose: Nose normal.   Mouth/Throat: Oropharynx is clear and moist.   Neck: Normal range of motion. Neck supple.   Cardiovascular: Normal rate, regular rhythm and normal heart sounds.   Pulmonary/Chest: Effort normal and breath sounds normal.   Abdominal: Soft. Bowel sounds are normal. She exhibits no distension and no mass. There is no tenderness. There is no rebound and no guarding. No hernia.   Musculoskeletal: She exhibits no tenderness.   No CVA tenderness present.   Neurological: She is alert and oriented to person, place, and time.   Skin: Skin is warm and dry.   Psychiatric: She has a normal mood and affect. Her behavior is normal. Judgment and thought content normal.   Vitals reviewed.              Assessment/Plan:   Pt is a 34 year old female who presents for evaluation of dysuria.  Pt states she has had frequency for the past 7 days.  Denies fevers, flank pain/chills, nausea/vomiting, or vaginal discharge.  Pt is not pregnant, diabetic or immunocompromised.  No use of catheters.  Vital signs normal.  Pt does not appear ill or altered mental status.  " There is no PTP of the abdomen or CVA tenderness.  UA is suggestive of bacteriuria.    1. Dysuria    - POCT Urinalysis  - POCT PREGNANCY  - nitrofurantoin monohyd macro (MACROBID) 100 MG Cap; Take 1 Cap by mouth every 12 hours for 5 days.  Dispense: 10 Cap; Refill: 0    Differential diagnosis, natural history, supportive care discussed. Follow-up with primary care provider within 7-10 days, emergency room precautions discussed.  Patient and/or family appears understanding of information.  Handout and review of patients diagnosis and treatment was discussed extensively.

## 2019-10-03 ENCOUNTER — TELEPHONE (OUTPATIENT)
Dept: URGENT CARE | Facility: CLINIC | Age: 34
End: 2019-10-03

## 2019-10-03 DIAGNOSIS — B37.9 YEAST INFECTION: ICD-10-CM

## 2019-10-03 RX ORDER — FLUCONAZOLE 150 MG/1
TABLET ORAL
Qty: 2 TAB | Refills: 0 | Status: SHIPPED | OUTPATIENT
Start: 2019-10-03 | End: 2019-10-09

## 2019-10-04 ENCOUNTER — OFFICE VISIT (OUTPATIENT)
Dept: URGENT CARE | Facility: PHYSICIAN GROUP | Age: 34
End: 2019-10-04
Payer: COMMERCIAL

## 2019-10-04 ENCOUNTER — HOSPITAL ENCOUNTER (OUTPATIENT)
Facility: MEDICAL CENTER | Age: 34
End: 2019-10-04
Attending: PHYSICIAN ASSISTANT
Payer: COMMERCIAL

## 2019-10-04 ENCOUNTER — TELEPHONE (OUTPATIENT)
Dept: URGENT CARE | Facility: PHYSICIAN GROUP | Age: 34
End: 2019-10-04

## 2019-10-04 VITALS
DIASTOLIC BLOOD PRESSURE: 68 MMHG | HEIGHT: 63 IN | TEMPERATURE: 98.2 F | WEIGHT: 125 LBS | RESPIRATION RATE: 16 BRPM | HEART RATE: 112 BPM | OXYGEN SATURATION: 96 % | SYSTOLIC BLOOD PRESSURE: 106 MMHG | BODY MASS INDEX: 22.15 KG/M2

## 2019-10-04 DIAGNOSIS — R30.0 DYSURIA: ICD-10-CM

## 2019-10-04 LAB
APPEARANCE UR: NORMAL
BILIRUB UR STRIP-MCNC: NORMAL MG/DL
COLOR UR AUTO: YELLOW
GLUCOSE UR STRIP.AUTO-MCNC: NORMAL MG/DL
KETONES UR STRIP.AUTO-MCNC: NORMAL MG/DL
LEUKOCYTE ESTERASE UR QL STRIP.AUTO: NORMAL
NITRITE UR QL STRIP.AUTO: NORMAL
PH UR STRIP.AUTO: 7 [PH] (ref 5–8)
PROT UR QL STRIP: NORMAL MG/DL
RBC UR QL AUTO: NORMAL
SP GR UR STRIP.AUTO: 1.01
UROBILINOGEN UR STRIP-MCNC: NORMAL MG/DL

## 2019-10-04 PROCEDURE — 87660 TRICHOMONAS VAGIN DIR PROBE: CPT

## 2019-10-04 PROCEDURE — 87086 URINE CULTURE/COLONY COUNT: CPT

## 2019-10-04 PROCEDURE — 87480 CANDIDA DNA DIR PROBE: CPT

## 2019-10-04 PROCEDURE — 81002 URINALYSIS NONAUTO W/O SCOPE: CPT | Performed by: PHYSICIAN ASSISTANT

## 2019-10-04 PROCEDURE — 87510 GARDNER VAG DNA DIR PROBE: CPT

## 2019-10-04 PROCEDURE — 87186 SC STD MICRODIL/AGAR DIL: CPT

## 2019-10-04 PROCEDURE — 99214 OFFICE O/P EST MOD 30 MIN: CPT | Performed by: PHYSICIAN ASSISTANT

## 2019-10-04 PROCEDURE — 87077 CULTURE AEROBIC IDENTIFY: CPT

## 2019-10-04 ASSESSMENT — ENCOUNTER SYMPTOMS
ABDOMINAL PAIN: 0
VOMITING: 0
NAUSEA: 0
PALPITATIONS: 0
SHORTNESS OF BREATH: 0
FEVER: 0
FLANK PAIN: 0
COUGH: 0
CHILLS: 0
BACK PAIN: 0

## 2019-10-04 NOTE — PROGRESS NOTES
Subjective:      Nadine Fink is a 34 y.o. female who presents with Dysuria (was just treated for uti but not feeling much better)            Dysuria    This is a recurrent problem. The current episode started in the past 7 days. The problem occurs intermittently. The problem has been unchanged. The quality of the pain is described as burning. The pain is moderate. Associated symptoms include frequency and urgency. Pertinent negatives include no chills, flank pain, hematuria, nausea or vomiting. She has tried antibiotics for the symptoms. The treatment provided moderate relief.       Review of Systems   Constitutional: Negative for chills and fever.   Respiratory: Negative for cough and shortness of breath.    Cardiovascular: Negative for chest pain and palpitations.   Gastrointestinal: Negative for abdominal pain, nausea and vomiting.   Genitourinary: Positive for dysuria, frequency and urgency. Negative for flank pain and hematuria.   Musculoskeletal: Negative for back pain.   All other systems reviewed and are negative.    PMH:  has a past medical history of Dental disorder.  MEDS:   Current Outpatient Medications:   •  fluconazole (DIFLUCAN) 150 MG tablet, Take 1 tablet.  Repeat in 72 hours if symptoms do not resolve., Disp: 2 Tab, Rfl: 0  •  vitamin D, Ergocalciferol, (DRISDOL) 54773 units Cap capsule, Take 1 Cap by mouth every 7 days. Labs every three months, do not refill until after labs., Disp: 12 Cap, Rfl: 0  ALLERGIES:   Allergies   Allergen Reactions   • Sulfa Drugs Itching     SURGHX:   Past Surgical History:   Procedure Laterality Date   • MAMMOPLASTY AUGMENTATION Bilateral 4/10/2018    Procedure: MAMMOPLASTY AUGMENTATION;  Surgeon: Aubrey Crowder M.D.;  Location: SURGERY [facility];  Service: Plastics   • REPEAT C SECTION W TUBAL LIGATION  5/7/2017    Procedure: REPEAT C SECTION WITH TUBAL LIGATION;  Surgeon: Nadine Dobbs M.D.;  Location: LABOR AND DELIVERY;  Service:    •  "PRIMARY C SECTION  8/5/2015    Procedure: PRIMARY C SECTION;  Surgeon: Patti Shah M.D.;  Location: LABOR AND DELIVERY;  Service:    • DENTAL SURGERY  2009    oral surgeon \"sinus lift and bone graft\"     SOCHX:  reports that she has never smoked. She has never used smokeless tobacco. She reports that she drinks alcohol. She reports that she does not use drugs.  FH: Family history was reviewed, no pertinent findings to report  Medications, Allergies, and current problem list reviewed today in Epic       Objective:     Blood Pressure 106/68 (BP Location: Right arm, Patient Position: Sitting, BP Cuff Size: Adult)   Pulse (Abnormal) 112   Temperature 36.8 °C (98.2 °F) (Tympanic)   Respiration 16   Height 1.6 m (5' 3\")   Weight 56.7 kg (125 lb)   Oxygen Saturation 96%   Body Mass Index 22.14 kg/m²      Physical Exam   Constitutional: She is oriented to person, place, and time. She appears well-developed and well-nourished.   HENT:   Head: Normocephalic and atraumatic.   Right Ear: External ear normal.   Left Ear: External ear normal.   Nose: Nose normal.   Mouth/Throat: Oropharynx is clear and moist.   Neck: Normal range of motion. Neck supple.   Cardiovascular: Normal rate, regular rhythm and normal heart sounds.   Pulmonary/Chest: Effort normal and breath sounds normal.   Abdominal: Soft. Bowel sounds are normal. She exhibits no distension and no mass. There is no tenderness. There is no rebound and no guarding. No hernia.   Musculoskeletal: She exhibits no tenderness.   No CVA tenderness present.   Neurological: She is alert and oriented to person, place, and time.   Skin: Skin is warm and dry.   Psychiatric: She has a normal mood and affect. Her behavior is normal. Judgment and thought content normal.   Vitals reviewed.              Assessment/Plan:     1. Dysuria  - recurrent problem.  Hold treatment while culture is pending.  - VAGINAL PATHOGENS DNA PANEL; Future  - POCT Urinalysis  - URINE CULTURE(NEW); " Future    Differential diagnosis, natural history, supportive care discussed. Follow-up with primary care provider within 7-10 days, emergency room precautions discussed.  Patient and/or family appears understanding of information.  Handout and review of patients diagnosis and treatment was discussed extensively.

## 2019-10-04 NOTE — TELEPHONE ENCOUNTER
----- Message from Chelsy Sheehan, Med Ass't sent at 10/2/2019  4:21 PM PDT -----  Regarding: FW: Non-Urgent Medical Question  Contact: [phone]      ----- Message -----  From: Nadine Fink  Sent: 10/2/2019   1:32 PM PDT  To: Mychart [facility]  Message Pool  Subject: Non-Urgent Medical Question                      Prescription Question  Eduardo,  I saw you in regards to a UTI. I finished my last pill for that on Sunday. I had a yogurt each day. But am not feeling very good down there today. [place] of [place]. I think I now have a yeast infection. Any help would be appreciated. If no response I'll use a monistat  or something.  Thank you

## 2019-10-05 LAB
CANDIDA DNA VAG QL PROBE+SIG AMP: NEGATIVE
G VAGINALIS DNA VAG QL PROBE+SIG AMP: NEGATIVE
T VAGINALIS DNA VAG QL PROBE+SIG AMP: NEGATIVE

## 2019-10-06 LAB
BACTERIA UR CULT: ABNORMAL
BACTERIA UR CULT: ABNORMAL
SIGNIFICANT IND 70042: ABNORMAL
SITE SITE: ABNORMAL
SOURCE SOURCE: ABNORMAL

## 2019-10-09 ENCOUNTER — HOSPITAL ENCOUNTER (OUTPATIENT)
Dept: LAB | Facility: MEDICAL CENTER | Age: 34
End: 2019-10-09
Attending: FAMILY MEDICINE
Payer: COMMERCIAL

## 2019-10-09 ENCOUNTER — OFFICE VISIT (OUTPATIENT)
Dept: MEDICAL GROUP | Facility: PHYSICIAN GROUP | Age: 34
End: 2019-10-09
Payer: COMMERCIAL

## 2019-10-09 VITALS
OXYGEN SATURATION: 97 % | WEIGHT: 125 LBS | DIASTOLIC BLOOD PRESSURE: 68 MMHG | TEMPERATURE: 98.2 F | SYSTOLIC BLOOD PRESSURE: 110 MMHG | RESPIRATION RATE: 18 BRPM | HEART RATE: 60 BPM | HEIGHT: 63 IN | BODY MASS INDEX: 22.15 KG/M2

## 2019-10-09 DIAGNOSIS — J30.1 SEASONAL ALLERGIC RHINITIS DUE TO POLLEN: ICD-10-CM

## 2019-10-09 DIAGNOSIS — N39.0 RECURRENT UTI: ICD-10-CM

## 2019-10-09 DIAGNOSIS — N92.0 MENORRHAGIA WITH REGULAR CYCLE: ICD-10-CM

## 2019-10-09 PROBLEM — J30.9 ALLERGIC RHINITIS: Status: ACTIVE | Noted: 2019-10-09

## 2019-10-09 PROBLEM — Z41.1 ENCOUNTER FOR COSMETIC SURGERY: Status: RESOLVED | Noted: 2018-04-10 | Resolved: 2019-10-09

## 2019-10-09 LAB
ERYTHROCYTE [DISTWIDTH] IN BLOOD BY AUTOMATED COUNT: 40 FL (ref 35.9–50)
HCT VFR BLD AUTO: 43.7 % (ref 37–47)
HGB BLD-MCNC: 13.9 G/DL (ref 12–16)
MCH RBC QN AUTO: 29 PG (ref 27–33)
MCHC RBC AUTO-ENTMCNC: 31.8 G/DL (ref 33.6–35)
MCV RBC AUTO: 91.2 FL (ref 81.4–97.8)
PLATELET # BLD AUTO: 205 K/UL (ref 164–446)
PMV BLD AUTO: 11 FL (ref 9–12.9)
RBC # BLD AUTO: 4.79 M/UL (ref 4.2–5.4)
WBC # BLD AUTO: 5.9 K/UL (ref 4.8–10.8)

## 2019-10-09 PROCEDURE — 99203 OFFICE O/P NEW LOW 30 MIN: CPT | Performed by: FAMILY MEDICINE

## 2019-10-09 PROCEDURE — 36415 COLL VENOUS BLD VENIPUNCTURE: CPT

## 2019-10-09 PROCEDURE — 85027 COMPLETE CBC AUTOMATED: CPT

## 2019-10-09 ASSESSMENT — PATIENT HEALTH QUESTIONNAIRE - PHQ9: CLINICAL INTERPRETATION OF PHQ2 SCORE: 0

## 2019-10-09 NOTE — PROGRESS NOTES
"cc: annual exam       Subjective:     Nadine Fink is a 34 y.o. female presenting for the following:     Recurrent UTI: Patient has at least 1-2 UTIs per year. Patient is monogamous with . She lives out of town so is sometimes delayed in getting antibiotics but she can feel very well when she has one.  He normally has no difficulty but she will have extreme urinary frequency and burning whenever she has a UTI.  She has never had pyelonephritis.    She does have a bilateral tubal ligation but was previously on combined oral contraceptive pills for heavy menses.  She has stopped taking these and she has regular menses but about 7 days and each day is heavy.  They are not very painful they are comfortable.  She does not have any shortness of breath, decreased exercise capacity, pallor.        Review of systems:  All others reviewed and are negative.     No current outpatient medications on file.    Allergies, past medical history, past surgical history, family history, social history reviewed and updated    Objective:     Vitals: /68 (BP Location: Left arm, Patient Position: Sitting, BP Cuff Size: Adult)   Pulse 60   Temp 36.8 °C (98.2 °F) (Temporal)   Resp 18   Ht 1.6 m (5' 3\")   Wt 56.7 kg (125 lb)   SpO2 97%   BMI 22.14 kg/m²   General: Alert, pleasant, NAD  HEENT: Normocephalic.   EOMI, no icterus or pallor.  Conjunctivae and lids normal. External ears normal. Oropharynx non-erythematous, mucous membranes moist.    Neck supple.  No thyromegaly or masses palpated. No cervical or supraclavicular lymphadenopathy.  Heart: Regular rate and rhythm.  S1 and S2 normal.  No murmurs appreciated.  Respiratory: Normal respiratory effort.  Clear to auscultation bilaterally.  Abdomen: Non-distended, soft  Skin: Warm, dry, no rashes.  Musculoskeletal: Gait is normal.  Moves all extremities well.  Extremities: No leg edema.    Neurological: No tremors, sensation grossly intact,  tone/strength normal, " gait is normal, CN2-12 grossly intact  Psych:  Affect is normal, judgement is good, memory is intact, grooming is appropriate.    Assessment/Plan:     Nadine was seen today for establish care.    Diagnoses and all orders for this visit:    Recurrent UTI: Patient is monogamous with  but still gets 1-2 UTIs per year.  As she lives out of town I explained that if she messages me I can put in the order for urine culture and treat empirically with a non-sulfa antibiotic until we can get her an appointment.    Menorrhagia with regular cycle: We will check for anemia.  Explained to patient that as she does not have migraine with aura, history of blood clot, and she is a non-smoker that she is low risk and has no contraindications for restarting combined oral contraceptive pills if she would like she declines currently.  -     CBC WITHOUT DIFFERENTIAL; Future    Seasonal allergic rhinitis due to pollen: Chronic problem currently well controlled with Flonase and over-the-counter antihistamines.  She has needed Singulair in the past.    Return if symptoms worsen or fail to improve.

## 2020-01-15 DIAGNOSIS — B37.9 YEAST INFECTION: ICD-10-CM

## 2020-01-15 RX ORDER — FLUCONAZOLE 150 MG/1
TABLET ORAL
Qty: 2 TAB | Refills: 0 | Status: SHIPPED
Start: 2020-01-15 | End: 2020-01-30

## 2020-01-15 NOTE — TELEPHONE ENCOUNTER
----- Message from Nadine Fink sent at 1/15/2020  6:59 AM PST -----  Regarding: Prescription Question  Contact: [phone]  Good Morning!  I had two prescriptions filled for 2 each Fluconazole Tablet  MSG.  I have now exhausted them.  May I please get two more filled so I can have them in my purse in case of a yeast infection.  I would appreciate it. Thank you

## 2020-03-18 RX ORDER — FLUCONAZOLE 150 MG/1
150 TABLET ORAL DAILY
Qty: 2 TAB | Refills: 0 | Status: SHIPPED | OUTPATIENT
Start: 2020-03-18 | End: 2020-03-19

## 2020-03-18 NOTE — TELEPHONE ENCOUNTER
1. Caller Name: Nadine Fink                            Call Back Number: [phone] (home)         How would the patient prefer to be contacted with a response: MyChart message    Patient would like to get two more fluconazole refills sent to [facility] pharmacy. She stated that she is symptomatic of a yeast infection.     Please advise.

## 2020-03-19 ENCOUNTER — PATIENT MESSAGE (OUTPATIENT)
Dept: MEDICAL GROUP | Facility: PHYSICIAN GROUP | Age: 35
End: 2020-03-19

## 2020-03-19 DIAGNOSIS — R30.0 DYSURIA: ICD-10-CM

## 2020-03-19 RX ORDER — NITROFURANTOIN 25; 75 MG/1; MG/1
100 CAPSULE ORAL EVERY 12 HOURS
Qty: 10 CAP | Refills: 0 | Status: SHIPPED | OUTPATIENT
Start: 2020-03-19 | End: 2020-03-24

## 2020-08-11 ENCOUNTER — OFFICE VISIT (OUTPATIENT)
Dept: MEDICAL GROUP | Facility: PHYSICIAN GROUP | Age: 35
End: 2020-08-11
Payer: COMMERCIAL

## 2020-08-11 ENCOUNTER — HOSPITAL ENCOUNTER (OUTPATIENT)
Facility: MEDICAL CENTER | Age: 35
End: 2020-08-11
Attending: INTERNAL MEDICINE
Payer: COMMERCIAL

## 2020-08-11 VITALS
SYSTOLIC BLOOD PRESSURE: 100 MMHG | TEMPERATURE: 99.2 F | DIASTOLIC BLOOD PRESSURE: 62 MMHG | BODY MASS INDEX: 21.09 KG/M2 | HEIGHT: 63 IN | HEART RATE: 60 BPM | WEIGHT: 119 LBS | OXYGEN SATURATION: 98 %

## 2020-08-11 DIAGNOSIS — N39.0 RECURRENT UTI: ICD-10-CM

## 2020-08-11 DIAGNOSIS — Z13.220 LIPID SCREENING: ICD-10-CM

## 2020-08-11 DIAGNOSIS — Z00.00 HEALTH CARE MAINTENANCE: ICD-10-CM

## 2020-08-11 DIAGNOSIS — Z13.1 DIABETES MELLITUS SCREENING: ICD-10-CM

## 2020-08-11 DIAGNOSIS — E55.9 VITAMIN D DEFICIENCY: ICD-10-CM

## 2020-08-11 LAB
APPEARANCE UR: CLEAR
BILIRUB UR STRIP-MCNC: NEGATIVE MG/DL
COLOR UR AUTO: YELLOW
GLUCOSE UR STRIP.AUTO-MCNC: NEGATIVE MG/DL
KETONES UR STRIP.AUTO-MCNC: NEGATIVE MG/DL
LEUKOCYTE ESTERASE UR QL STRIP.AUTO: NORMAL
NITRITE UR QL STRIP.AUTO: NEGATIVE
PH UR STRIP.AUTO: 7 [PH] (ref 5–8)
PROT UR QL STRIP: NEGATIVE MG/DL
RBC UR QL AUTO: NORMAL
SP GR UR STRIP.AUTO: 1.01
UROBILINOGEN UR STRIP-MCNC: 0.2 MG/DL

## 2020-08-11 PROCEDURE — 99204 OFFICE O/P NEW MOD 45 MIN: CPT | Performed by: INTERNAL MEDICINE

## 2020-08-11 PROCEDURE — 87086 URINE CULTURE/COLONY COUNT: CPT

## 2020-08-11 PROCEDURE — 81002 URINALYSIS NONAUTO W/O SCOPE: CPT | Performed by: INTERNAL MEDICINE

## 2020-08-11 RX ORDER — NITROFURANTOIN 25; 75 MG/1; MG/1
100 CAPSULE ORAL 2 TIMES DAILY
Qty: 10 CAP | Refills: 0 | Status: SHIPPED | OUTPATIENT
Start: 2020-08-11 | End: 2020-08-16

## 2020-08-11 RX ORDER — FLUCONAZOLE 150 MG/1
150 TABLET ORAL DAILY
Qty: 1 TAB | Refills: 1 | Status: SHIPPED | OUTPATIENT
Start: 2020-08-11 | End: 2021-04-16

## 2020-08-11 ASSESSMENT — PATIENT HEALTH QUESTIONNAIRE - PHQ9: CLINICAL INTERPRETATION OF PHQ2 SCORE: 0

## 2020-08-12 NOTE — PROGRESS NOTES
"New Patient to establish    Chief Complaint   Patient presents with   • Follow-Up       Subjective:     History of Present Illness: Patient is a 35 y.o. female who is here today to establish primary care, urine burning    2. Recurrent UTI  -History of the above, possibly related to sexual behavior versus other etiologies including dysbiosis  -Mention since few days, having some burning of the urine, frequency, responding fine with Uricalm over-the-counter supplement  -Denied having renal stone, fever, chills, abdominal pain, nausea, vomiting, anatomic abnormalities, history of pelvic surgeries besides tubal ligation  -At least 2 infections a year, she is also taking Diflucan to cover yeast infection, mention she has some whitish vaginal discharge as well    No current outpatient medications on file prior to visit.     No current facility-administered medications on file prior to visit.      Allergies   Allergen Reactions   • Sulfa Drugs Itching     Patient Active Problem List    Diagnosis Date Noted   • Recurrent UTI 10/09/2019   • Menorrhagia with regular cycle 10/09/2019   • Allergic rhinitis 10/09/2019   • Vitamin D deficiency 12/19/2017     Past Medical History:   Diagnosis Date   • Dental disorder     dental implant, upper right     Past Surgical History:   Procedure Laterality Date   • MAMMOPLASTY AUGMENTATION Bilateral 4/10/2018    Procedure: MAMMOPLASTY AUGMENTATION;  Surgeon: Aubrey Crowder M.D.;  Location: SURGERY [facility];  Service: Plastics   • REPEAT C SECTION W TUBAL LIGATION  5/7/2017    Procedure: REPEAT C SECTION WITH TUBAL LIGATION;  Surgeon: Nadine Dobbs M.D.;  Location: LABOR AND DELIVERY;  Service:    • PRIMARY C SECTION  8/5/2015    Procedure: PRIMARY C SECTION;  Surgeon: Patti Shah M.D.;  Location: LABOR AND DELIVERY;  Service:    • DENTAL SURGERY  2009    oral surgeon \"sinus lift and bone graft\"     Family History   Problem Relation Age of Onset   • Alcohol abuse Mother  " "  • Alcohol abuse Father    • No Known Problems Sister    • Non-contributory Neg Hx      Social History     Tobacco Use   • Smoking status: Never Smoker   • Smokeless tobacco: Never Used   Substance Use Topics   • Alcohol use: Yes     Alcohol/week: 0.0 oz     Comment: Occ   • Drug use: No         ROS:     - Constitutional: Negative for fever, chills,    - Eye: Negative for blurry vision    -ENT: Negative for ear pain    - Respiratory: Negative for cough, hemoptysis    - Cardiovascular: Negative for chest pain     - Gastrointestinal: Negative for abdominal pain    - Genitourinary: Negative for dysuria    - Musculoskeletal: Negative for joint swelling    - Skin: Negative for itching    - Neurological: Negative for focal weakness     - Psychiatric/Behavioral: Negative for depression        Physical Exam:     /62 (BP Location: Right arm, Patient Position: Sitting, BP Cuff Size: Adult)   Pulse 60   Temp 37.3 °C (99.2 °F) (Temporal)   Ht 1.6 m (5' 3\")   Wt 54 kg (119 lb)   SpO2 98%   BMI 21.08 kg/m²   General: Normal appearing. No distress.  ENT: oropharynx without exudates.    Eyes: conjunctiva clear lids without ptosis  Pulmonary: Clear to ausculation.  Normal effort.   Cardiovascular: Regular rate and rhythm  Abdomen: Soft, nontender,  Lymph: No cervical or supraclavicular palpable lymph nodes  Psych: Normal mood and affect.     I have reviewed pertinent labs and diagnostic tests associated with this visit with specific comments listed under the assessment and plan below      Assessment and Plan:     1. Vitamin D deficiency  - VITAMIN D,25 HYDROXY; Future    2. Recurrent UTI  - URINALYSIS; Future  - URINE CULTURE(NEW); Future>> advised to take antibiotic after sending urine sample for culture  - nitrofurantoin (MACROBID) 100 MG Cap; Take 1 Cap by mouth 2 times a day for 5 days.  Dispense: 10 Cap; Refill: 0  - fluconazole (DIFLUCAN) 150 MG tablet; Take 1 Tab by mouth every day.  Dispense: 1 Tab; Refill: 1  - " POCT Urinalysis>> today in the office, unremarkable  -Other detailed personal hygiene given to the patient regarding prevention of recurrent UTI    3. Health care maintenance  - Comp Metabolic Panel; Future  - Lipid Profile; Future  - TSH WITH REFLEX TO FT4; Future    4. Diabetes mellitus screening  - Comp Metabolic Panel; Future    5. Lipid screening  - Lipid Profile; Future      Follow Up:      Return in about 6 months (around 2/11/2021) for follow up.    Please note that this dictation was created using voice recognition software. I have made every reasonable attempt to correct obvious errors, but I expect that there are errors of grammar and possibly content that I did not discover before finalizing the note.    Signed by: Benton Bhardwaj M.D.

## 2020-08-14 ENCOUNTER — HOSPITAL ENCOUNTER (OUTPATIENT)
Dept: LAB | Facility: MEDICAL CENTER | Age: 35
End: 2020-08-14
Attending: INTERNAL MEDICINE
Payer: COMMERCIAL

## 2020-08-14 DIAGNOSIS — Z13.220 LIPID SCREENING: ICD-10-CM

## 2020-08-14 DIAGNOSIS — N39.0 RECURRENT UTI: ICD-10-CM

## 2020-08-14 DIAGNOSIS — R10.2 PELVIC PAIN SYNDROME: ICD-10-CM

## 2020-08-14 DIAGNOSIS — Z00.00 HEALTH CARE MAINTENANCE: ICD-10-CM

## 2020-08-14 DIAGNOSIS — E55.9 VITAMIN D DEFICIENCY: ICD-10-CM

## 2020-08-14 DIAGNOSIS — Z13.1 DIABETES MELLITUS SCREENING: ICD-10-CM

## 2020-08-14 LAB
25(OH)D3 SERPL-MCNC: 42 NG/ML (ref 30–100)
ALBUMIN SERPL BCP-MCNC: 4.4 G/DL (ref 3.2–4.9)
ALBUMIN/GLOB SERPL: 2.2 G/DL
ALP SERPL-CCNC: 50 U/L (ref 30–99)
ALT SERPL-CCNC: 10 U/L (ref 2–50)
ANION GAP SERPL CALC-SCNC: 12 MMOL/L (ref 7–16)
APPEARANCE UR: CLEAR
AST SERPL-CCNC: 15 U/L (ref 12–45)
BACTERIA #/AREA URNS HPF: NEGATIVE /HPF
BACTERIA UR CULT: NORMAL
BILIRUB SERPL-MCNC: 0.5 MG/DL (ref 0.1–1.5)
BILIRUB UR QL STRIP.AUTO: NEGATIVE
BUN SERPL-MCNC: 13 MG/DL (ref 8–22)
CALCIUM SERPL-MCNC: 9 MG/DL (ref 8.5–10.5)
CHLORIDE SERPL-SCNC: 104 MMOL/L (ref 96–112)
CHOLEST SERPL-MCNC: 155 MG/DL (ref 100–199)
CO2 SERPL-SCNC: 26 MMOL/L (ref 20–33)
COLOR UR: YELLOW
CREAT SERPL-MCNC: 0.57 MG/DL (ref 0.5–1.4)
EPI CELLS #/AREA URNS HPF: NEGATIVE /HPF
FASTING STATUS PATIENT QL REPORTED: NORMAL
GLOBULIN SER CALC-MCNC: 2 G/DL (ref 1.9–3.5)
GLUCOSE SERPL-MCNC: 90 MG/DL (ref 65–99)
GLUCOSE UR STRIP.AUTO-MCNC: NEGATIVE MG/DL
HDLC SERPL-MCNC: 69 MG/DL
HYALINE CASTS #/AREA URNS LPF: ABNORMAL /LPF
KETONES UR STRIP.AUTO-MCNC: NEGATIVE MG/DL
LDLC SERPL CALC-MCNC: 71 MG/DL
LEUKOCYTE ESTERASE UR QL STRIP.AUTO: ABNORMAL
MICRO URNS: ABNORMAL
NITRITE UR QL STRIP.AUTO: NEGATIVE
PH UR STRIP.AUTO: 7 [PH] (ref 5–8)
POTASSIUM SERPL-SCNC: 4 MMOL/L (ref 3.6–5.5)
PROT SERPL-MCNC: 6.4 G/DL (ref 6–8.2)
PROT UR QL STRIP: NEGATIVE MG/DL
RBC # URNS HPF: ABNORMAL /HPF
RBC UR QL AUTO: ABNORMAL
SIGNIFICANT IND 70042: NORMAL
SITE SITE: NORMAL
SODIUM SERPL-SCNC: 142 MMOL/L (ref 135–145)
SOURCE SOURCE: NORMAL
SP GR UR STRIP.AUTO: 1
TRIGL SERPL-MCNC: 73 MG/DL (ref 0–149)
TSH SERPL DL<=0.005 MIU/L-ACNC: 2.08 UIU/ML (ref 0.38–5.33)
UROBILINOGEN UR STRIP.AUTO-MCNC: 0.2 MG/DL
WBC #/AREA URNS HPF: ABNORMAL /HPF

## 2020-08-14 PROCEDURE — 81001 URINALYSIS AUTO W/SCOPE: CPT

## 2020-08-14 PROCEDURE — 80061 LIPID PANEL: CPT

## 2020-08-14 PROCEDURE — 36415 COLL VENOUS BLD VENIPUNCTURE: CPT

## 2020-08-14 PROCEDURE — 80053 COMPREHEN METABOLIC PANEL: CPT

## 2020-08-14 PROCEDURE — 84443 ASSAY THYROID STIM HORMONE: CPT

## 2020-08-14 PROCEDURE — 82306 VITAMIN D 25 HYDROXY: CPT

## 2021-04-16 ENCOUNTER — OFFICE VISIT (OUTPATIENT)
Dept: URGENT CARE | Facility: PHYSICIAN GROUP | Age: 36
End: 2021-04-16
Payer: COMMERCIAL

## 2021-04-16 ENCOUNTER — HOSPITAL ENCOUNTER (OUTPATIENT)
Facility: MEDICAL CENTER | Age: 36
End: 2021-04-16
Attending: NURSE PRACTITIONER
Payer: COMMERCIAL

## 2021-04-16 VITALS
HEIGHT: 64 IN | DIASTOLIC BLOOD PRESSURE: 64 MMHG | TEMPERATURE: 99 F | SYSTOLIC BLOOD PRESSURE: 116 MMHG | WEIGHT: 125 LBS | RESPIRATION RATE: 18 BRPM | BODY MASS INDEX: 21.34 KG/M2 | OXYGEN SATURATION: 97 % | HEART RATE: 69 BPM

## 2021-04-16 DIAGNOSIS — N30.90 CYSTITIS: ICD-10-CM

## 2021-04-16 DIAGNOSIS — R30.0 DYSURIA: ICD-10-CM

## 2021-04-16 LAB
APPEARANCE UR: CLEAR
BILIRUB UR STRIP-MCNC: NEGATIVE MG/DL
COLOR UR AUTO: NORMAL
GLUCOSE UR STRIP.AUTO-MCNC: NEGATIVE MG/DL
INT CON NEG: NEGATIVE
INT CON POS: POSITIVE
KETONES UR STRIP.AUTO-MCNC: NEGATIVE MG/DL
LEUKOCYTE ESTERASE UR QL STRIP.AUTO: NORMAL
NITRITE UR QL STRIP.AUTO: NEGATIVE
PH UR STRIP.AUTO: 7 [PH] (ref 5–8)
POC URINE PREGNANCY TEST: NEGATIVE
PROT UR QL STRIP: NEGATIVE MG/DL
RBC UR QL AUTO: NEGATIVE
SP GR UR STRIP.AUTO: 1.01
UROBILINOGEN UR STRIP-MCNC: 0.2 MG/DL

## 2021-04-16 PROCEDURE — 81025 URINE PREGNANCY TEST: CPT | Performed by: NURSE PRACTITIONER

## 2021-04-16 PROCEDURE — 99214 OFFICE O/P EST MOD 30 MIN: CPT | Performed by: NURSE PRACTITIONER

## 2021-04-16 PROCEDURE — 87086 URINE CULTURE/COLONY COUNT: CPT

## 2021-04-16 PROCEDURE — 81002 URINALYSIS NONAUTO W/O SCOPE: CPT | Performed by: NURSE PRACTITIONER

## 2021-04-16 RX ORDER — FLUCONAZOLE 150 MG/1
TABLET ORAL
Qty: 4 TABLET | Refills: 0 | Status: SHIPPED | OUTPATIENT
Start: 2021-04-16 | End: 2022-02-02

## 2021-04-16 RX ORDER — CEFDINIR 300 MG/1
300 CAPSULE ORAL 2 TIMES DAILY
Qty: 10 CAPSULE | Refills: 0 | Status: SHIPPED | OUTPATIENT
Start: 2021-04-16 | End: 2021-04-21

## 2021-04-16 ASSESSMENT — FIBROSIS 4 INDEX: FIB4 SCORE: 0.86

## 2021-04-16 ASSESSMENT — PAIN SCALES - GENERAL: PAINLEVEL: NO PAIN

## 2021-04-16 ASSESSMENT — ENCOUNTER SYMPTOMS: FLANK PAIN: 0

## 2021-04-16 NOTE — PROGRESS NOTES
Subjective:      Nadine Fink is a 35 y.o. female who presents with UTI (sx started last thursday with low dysruia and low hemtauria. 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     Employer: [employer]   Tobacco Use   • Smoking status: Never Smoker   • Smokeless tobacco: Never Used   Substance and Sexual Activity   • Alcohol use: Yes     Alcohol/week: 0.0 oz     Comment: Occ   • Drug use: No   • Sexual activity: Yes     Partners: Male     Birth control/protection: Female Sterilization   Other Topics Concern   • Not on file   Social History Narrative   • Not on file     Social Determinants of Health     Financial Resource Strain:    • Difficulty of Paying Living Expenses:    Food Insecurity:    • Worried About Running Out of Food in the Last Year:    • Ran Out of Food in the Last Year:    Transportation Needs:    • Lack of Transportation (Medical):    • Lack of Transportation (Non-Medical):    Physical Activity:    • Days of Exercise per Week:    • Minutes of Exercise per Session:    Stress:    • Feeling of Stress :    Social Connections:    • Frequency of Communication with Friends and Family:    • Frequency of Social Gatherings with Friends and Family:    • Attends [employer] Services:    • Active Member of Clubs or Organizations:    • Attends Club or Organization Meetings:    • Marital Status:    Intimate Partner Violence:    • Fear of Current or Ex-Partner:    • Emotionally Abused:    • Physically Abused:    • Sexually Abused:      Family History   Problem Relation Age of Onset   • Alcohol abuse Mother    • Alcohol abuse Father    • No Known Problems Sister    • Non-contributory Neg Hx   "      Allergies: Sulfa drugs    Patient is a 35-year-old female who presents today with complaint of dysuria, urgency, and frequency.  Symptoms started over the last week.  Positive history of urinary tract infections.  She states no fever, aches, or chills.  No lower back pain.  She also endorses mild vaginal itching and thinks that she may possibly have a yeast infection.        UTI  This is a new problem. The current episode started in the past 7 days. The problem occurs constantly. The problem has been unchanged. Associated symptoms include urinary symptoms. Nothing aggravates the symptoms. She has tried nothing for the symptoms. The treatment provided no relief.       Review of Systems   Genitourinary: Positive for dysuria, frequency and urgency. Negative for flank pain and hematuria.   All other systems reviewed and are negative.         Objective:     /64 (BP Location: Right arm, Patient Position: Sitting, BP Cuff Size: Adult)   Pulse 69   Temp 37.2 °C (99 °F) (Temporal)   Resp 18   Ht 1.626 m (5' 4\")   Wt 56.7 kg (125 lb)   LMP 03/29/2021   SpO2 97%   BMI 21.46 kg/m²      Physical Exam  Vitals reviewed.   Constitutional:       Appearance: Normal appearance.   Cardiovascular:      Rate and Rhythm: Normal rate and regular rhythm.      Heart sounds: Normal heart sounds.   Pulmonary:      Effort: Pulmonary effort is normal.      Breath sounds: Normal breath sounds.   Abdominal:      General: Abdomen is flat.      Tenderness: There is abdominal tenderness. There is no right CVA tenderness, left CVA tenderness, guarding or rebound.   Musculoskeletal:      Cervical back: Normal range of motion and neck supple.   Skin:     General: Skin is warm and dry.   Neurological:      Mental Status: She is alert and oriented to person, place, and time.   Psychiatric:         Mood and Affect: Mood normal.         Behavior: Behavior normal.         Thought Content: Thought content normal.         Judgment: Judgment " normal.       UA: negative nitrates, trace leukocytes    poct hCG: negative           Assessment/Plan:        1. Dysuria    - POCT Urinalysis  - POCT Pregnancy  - URINE CULTURE(NEW); Future  -omnicef  -Patient declined referral to urology at this time    2. Cystitis    - POCT Urinalysis  - POCT Pregnancy  - URINE CULTURE(NEW); Future  -omnicef  -push fluids  -ER precatuions: flank pain, fever >100.5, n/v, flu like symptoms.   -Patient declined referral to urology at this time    3.  Vaginal itching    -Prescription for Diflucan given at patient's request

## 2021-04-19 ENCOUNTER — PATIENT MESSAGE (OUTPATIENT)
Dept: URGENT CARE | Facility: PHYSICIAN GROUP | Age: 36
End: 2021-04-19

## 2021-04-19 LAB
BACTERIA UR CULT: NORMAL
SIGNIFICANT IND 70042: NORMAL
SITE SITE: NORMAL
SOURCE SOURCE: NORMAL

## 2021-04-20 NOTE — PROGRESS NOTES
Attempted to notify patient by phone of results of urine culture.  No answer.  Left detailed voice message with results and requested callback for follow-up.  Patient was also sent a message via [employer].

## 2021-05-03 DIAGNOSIS — R30.0 DYSURIA: ICD-10-CM

## 2021-05-13 ENCOUNTER — HOSPITAL ENCOUNTER (OUTPATIENT)
Facility: MEDICAL CENTER | Age: 36
End: 2021-05-13
Attending: PHYSICIAN ASSISTANT
Payer: COMMERCIAL

## 2021-05-13 PROCEDURE — 87086 URINE CULTURE/COLONY COUNT: CPT

## 2021-05-16 LAB
BACTERIA UR CULT: NORMAL
SIGNIFICANT IND 70042: NORMAL
SITE SITE: NORMAL
SOURCE SOURCE: NORMAL

## 2021-12-10 ENCOUNTER — HOSPITAL ENCOUNTER (OUTPATIENT)
Dept: LAB | Facility: MEDICAL CENTER | Age: 36
End: 2021-12-10
Attending: GENERAL PRACTICE
Payer: COMMERCIAL

## 2021-12-10 PROCEDURE — 86769 SARS-COV-2 COVID-19 ANTIBODY: CPT

## 2021-12-10 PROCEDURE — 36415 COLL VENOUS BLD VENIPUNCTURE: CPT

## 2021-12-15 LAB
SARS-COV-2 IGG SERPL IA-ACNC: 7.55 IV
SARS-COV-2 IGG SERPL QL IA: POSITIVE

## 2022-02-01 DIAGNOSIS — N30.90 CYSTITIS: ICD-10-CM

## 2022-02-01 DIAGNOSIS — R30.0 DYSURIA: ICD-10-CM

## 2022-02-02 RX ORDER — FLUCONAZOLE 150 MG/1
TABLET ORAL
Qty: 1 TABLET | Refills: 1 | Status: SHIPPED
Start: 2022-02-02 | End: 2022-08-13

## 2022-08-13 ENCOUNTER — OFFICE VISIT (OUTPATIENT)
Dept: URGENT CARE | Facility: PHYSICIAN GROUP | Age: 37
End: 2022-08-13
Payer: COMMERCIAL

## 2022-08-13 ENCOUNTER — HOSPITAL ENCOUNTER (OUTPATIENT)
Facility: MEDICAL CENTER | Age: 37
End: 2022-08-13
Attending: FAMILY MEDICINE
Payer: COMMERCIAL

## 2022-08-13 VITALS
SYSTOLIC BLOOD PRESSURE: 98 MMHG | WEIGHT: 120 LBS | HEIGHT: 64 IN | DIASTOLIC BLOOD PRESSURE: 56 MMHG | BODY MASS INDEX: 20.49 KG/M2 | TEMPERATURE: 97.8 F | OXYGEN SATURATION: 98 % | HEART RATE: 77 BPM

## 2022-08-13 DIAGNOSIS — R53.81 MALAISE AND FATIGUE: ICD-10-CM

## 2022-08-13 DIAGNOSIS — R53.83 MALAISE AND FATIGUE: ICD-10-CM

## 2022-08-13 LAB
APPEARANCE UR: CLEAR
BILIRUB UR STRIP-MCNC: NEGATIVE MG/DL
COLOR UR AUTO: YELLOW
GLUCOSE UR STRIP.AUTO-MCNC: NEGATIVE MG/DL
INT CON NEG: NORMAL
INT CON POS: NORMAL
KETONES UR STRIP.AUTO-MCNC: NEGATIVE MG/DL
LEUKOCYTE ESTERASE UR QL STRIP.AUTO: NORMAL
NITRITE UR QL STRIP.AUTO: NEGATIVE
PH UR STRIP.AUTO: 6.5 [PH] (ref 5–8)
POC URINE PREGNANCY TEST: NEGATIVE
PROT UR QL STRIP: NEGATIVE MG/DL
RBC UR QL AUTO: NORMAL
SP GR UR STRIP.AUTO: 1.01
UROBILINOGEN UR STRIP-MCNC: NORMAL MG/DL

## 2022-08-13 PROCEDURE — 99214 OFFICE O/P EST MOD 30 MIN: CPT | Performed by: FAMILY MEDICINE

## 2022-08-13 PROCEDURE — 87086 URINE CULTURE/COLONY COUNT: CPT

## 2022-08-13 PROCEDURE — 81025 URINE PREGNANCY TEST: CPT | Performed by: FAMILY MEDICINE

## 2022-08-13 PROCEDURE — 87186 SC STD MICRODIL/AGAR DIL: CPT

## 2022-08-13 PROCEDURE — 81002 URINALYSIS NONAUTO W/O SCOPE: CPT | Performed by: FAMILY MEDICINE

## 2022-08-13 PROCEDURE — 87077 CULTURE AEROBIC IDENTIFY: CPT

## 2022-08-13 ASSESSMENT — ENCOUNTER SYMPTOMS
HEADACHES: 1
MYALGIAS: 1
CHILLS: 1

## 2022-08-13 ASSESSMENT — PAIN SCALES - GENERAL: PAINLEVEL: 7=MODERATE-SEVERE PAIN

## 2022-08-13 NOTE — PROGRESS NOTES
"Subjective     Nadine Fink is a 37 y.o. female who presents with Fatigue, Chills (Pt states started Tuesday 8/9/22), Body Aches, Hot Flashes, and Shortness of Breath    - This is a pleasant and nontoxic appearing 37 y.o. female who has come to the walk-in clinic today for:    #1) ~5 days ago developed some malaise and aches and feeling hot and cold, started after having normal BP (no blood or diarrhea), since then had another episodes of this the following day after BM, has had some fatigue and headaches tired. At one point felt some mild pain RUQ abd, also when laying down a few times felt like has had to take a deep breath. Today feels better and back to normal. No measured fevers no cough/sinus or sore throat. Has local PCP but no recent lab work.       ALLERGIES:  Sulfa drugs     PMH:  Past Medical History:   Diagnosis Date    Dental disorder     dental implant, upper right        PSH:  Past Surgical History:   Procedure Laterality Date    MAMMOPLASTY AUGMENTATION Bilateral 4/10/2018    Procedure: MAMMOPLASTY AUGMENTATION;  Surgeon: Aubrey Crowder M.D.;  Location: SURGERY [facility];  Service: Plastics    REPEAT C SECTION W TUBAL LIGATION  5/7/2017    Procedure: REPEAT C SECTION WITH TUBAL LIGATION;  Surgeon: Nadine Dobbs M.D.;  Location: LABOR AND DELIVERY;  Service:     PRIMARY C SECTION  8/5/2015    Procedure: PRIMARY C SECTION;  Surgeon: aPtti Shah M.D.;  Location: LABOR AND DELIVERY;  Service:     DENTAL SURGERY  2009    oral surgeon \"sinus lift and bone graft\"       MEDS:  No current outpatient medications on file.    ** I have documented what I find to be significant in regards to past medical, social, family and surgical history  in my HPI or under PMH/PSH/FH review section, otherwise it is noncontributory **           HPI    Review of Systems   Constitutional:  Positive for chills and malaise/fatigue.   Musculoskeletal:  Positive for myalgias.   Neurological:  Positive for " "headaches.   All other systems reviewed and are negative.           Objective     BP (!) 98/56 (BP Location: Left arm, Patient Position: Sitting, BP Cuff Size: Adult)   Pulse 77   Temp 36.6 °C (97.8 °F) (Temporal)   Ht 1.613 m (5' 3.5\")   Wt 54.4 kg (120 lb)   SpO2 98%   BMI 20.92 kg/m²      Physical Exam  Vitals and nursing note reviewed.   Constitutional:       General: She is not in acute distress.     Appearance: Normal appearance. She is well-developed.   HENT:      Head: Normocephalic.      Mouth/Throat:      Mouth: Mucous membranes are moist.      Pharynx: Oropharynx is clear.   Cardiovascular:      Heart sounds: Normal heart sounds. No murmur heard.  Pulmonary:      Effort: Pulmonary effort is normal. No respiratory distress.      Breath sounds: Normal breath sounds.   Abdominal:      Palpations: Abdomen is soft.      Tenderness: There is no abdominal tenderness.   Neurological:      Mental Status: She is alert.      Motor: No abnormal muscle tone.   Psychiatric:         Mood and Affect: Mood normal.         Behavior: Behavior normal.                           Assessment & Plan       1. Malaise and fatigue  POCT Urinalysis    POCT Pregnancy    URINE CULTURE(NEW)    CBC WITH DIFFERENTIAL    Comp Metabolic Panel    TSH WITH REFLEX TO FT4        ** nonspecific symptoms, may have been viral illness. Will check some lab work and advised to f/u pcp     - Dx, plan & d/c instructions discussed   - Rest, stay hydrated  - E.R. precautions discussed     Asked to kindly follow up with their PCP's office for a recheck on today's visit, ER if not improving in 2-3 days or if feeling/getting worse. If you do not have a primary care doctor and need to schedule one you may call [facility] at [phone] to do this.    Any realistic side effects of medications that may have been given today reviewed.     Patient left in stable condition     POCT results reviewed/discussed             "

## 2022-08-14 DIAGNOSIS — R53.81 MALAISE AND FATIGUE: ICD-10-CM

## 2022-08-14 DIAGNOSIS — R53.83 MALAISE AND FATIGUE: ICD-10-CM

## 2022-08-15 ENCOUNTER — TELEPHONE (OUTPATIENT)
Dept: URGENT CARE | Facility: CLINIC | Age: 37
End: 2022-08-15

## 2022-08-15 RX ORDER — NITROFURANTOIN 25; 75 MG/1; MG/1
100 CAPSULE ORAL 2 TIMES DAILY
Qty: 10 CAPSULE | Refills: 0 | Status: SHIPPED | OUTPATIENT
Start: 2022-08-15 | End: 2022-08-20

## 2022-08-15 NOTE — TELEPHONE ENCOUNTER
Kindly call (DOCUMENT CALL OR ATTEMPTED CALL IN EPIC) and let patient know:      Urine we sent to lab is growing some bacteria. I sent Rx Antibiotic to her pharmacy on file to start.

## 2022-08-18 ENCOUNTER — HOSPITAL ENCOUNTER (OUTPATIENT)
Dept: LAB | Facility: MEDICAL CENTER | Age: 37
End: 2022-08-18
Attending: FAMILY MEDICINE
Payer: COMMERCIAL

## 2022-08-18 DIAGNOSIS — R53.81 MALAISE AND FATIGUE: ICD-10-CM

## 2022-08-18 DIAGNOSIS — R53.83 MALAISE AND FATIGUE: ICD-10-CM

## 2022-08-18 LAB
ALBUMIN SERPL BCP-MCNC: 4.3 G/DL (ref 3.2–4.9)
ALBUMIN/GLOB SERPL: 1.4 G/DL
ALP SERPL-CCNC: 75 U/L (ref 30–99)
ALT SERPL-CCNC: 7 U/L (ref 2–50)
ANION GAP SERPL CALC-SCNC: 9 MMOL/L (ref 7–16)
AST SERPL-CCNC: 11 U/L (ref 12–45)
BASOPHILS # BLD AUTO: 1 % (ref 0–1.8)
BASOPHILS # BLD: 0.05 K/UL (ref 0–0.12)
BILIRUB SERPL-MCNC: 0.3 MG/DL (ref 0.1–1.5)
BUN SERPL-MCNC: 9 MG/DL (ref 8–22)
CALCIUM SERPL-MCNC: 9.2 MG/DL (ref 8.5–10.5)
CHLORIDE SERPL-SCNC: 102 MMOL/L (ref 96–112)
CO2 SERPL-SCNC: 27 MMOL/L (ref 20–33)
CREAT SERPL-MCNC: 0.67 MG/DL (ref 0.5–1.4)
EOSINOPHIL # BLD AUTO: 0.07 K/UL (ref 0–0.51)
EOSINOPHIL NFR BLD: 1.4 % (ref 0–6.9)
ERYTHROCYTE [DISTWIDTH] IN BLOOD BY AUTOMATED COUNT: 41.3 FL (ref 35.9–50)
GFR SERPLBLD CREATININE-BSD FMLA CKD-EPI: 114 ML/MIN/1.73 M 2
GLOBULIN SER CALC-MCNC: 3 G/DL (ref 1.9–3.5)
GLUCOSE SERPL-MCNC: 95 MG/DL (ref 65–99)
HCT VFR BLD AUTO: 37.6 % (ref 37–47)
HGB BLD-MCNC: 12 G/DL (ref 12–16)
IMM GRANULOCYTES # BLD AUTO: 0.03 K/UL (ref 0–0.11)
IMM GRANULOCYTES NFR BLD AUTO: 0.6 % (ref 0–0.9)
LYMPHOCYTES # BLD AUTO: 1.27 K/UL (ref 1–4.8)
LYMPHOCYTES NFR BLD: 25.9 % (ref 22–41)
MCH RBC QN AUTO: 27.1 PG (ref 27–33)
MCHC RBC AUTO-ENTMCNC: 31.9 G/DL (ref 33.6–35)
MCV RBC AUTO: 85.1 FL (ref 81.4–97.8)
MONOCYTES # BLD AUTO: 0.26 K/UL (ref 0–0.85)
MONOCYTES NFR BLD AUTO: 5.3 % (ref 0–13.4)
NEUTROPHILS # BLD AUTO: 3.22 K/UL (ref 2–7.15)
NEUTROPHILS NFR BLD: 65.8 % (ref 44–72)
NRBC # BLD AUTO: 0 K/UL
NRBC BLD-RTO: 0 /100 WBC
PLATELET # BLD AUTO: 290 K/UL (ref 164–446)
PMV BLD AUTO: 10.2 FL (ref 9–12.9)
POTASSIUM SERPL-SCNC: 3.9 MMOL/L (ref 3.6–5.5)
PROT SERPL-MCNC: 7.3 G/DL (ref 6–8.2)
RBC # BLD AUTO: 4.42 M/UL (ref 4.2–5.4)
SODIUM SERPL-SCNC: 138 MMOL/L (ref 135–145)
TSH SERPL DL<=0.005 MIU/L-ACNC: 1.06 UIU/ML (ref 0.38–5.33)
WBC # BLD AUTO: 4.9 K/UL (ref 4.8–10.8)

## 2022-08-18 PROCEDURE — 80053 COMPREHEN METABOLIC PANEL: CPT

## 2022-08-18 PROCEDURE — 36415 COLL VENOUS BLD VENIPUNCTURE: CPT

## 2022-08-18 PROCEDURE — 84443 ASSAY THYROID STIM HORMONE: CPT

## 2022-08-18 PROCEDURE — 85025 COMPLETE CBC W/AUTO DIFF WBC: CPT

## (undated) DEVICE — SUTURE 4-0 30CM X 30CM STRATAFIX SPRIAL PGA/PCL CLR FS-2 NEEDLE (12/BX)

## (undated) DEVICE — GLOVE BIOGEL SZ 6.5 SURGICAL PF LTX (50PR/BX 4BX/CA)

## (undated) DEVICE — HUMID-VENT HEAT AND MOISTURE EXCHANGE- (50/BX)

## (undated) DEVICE — ELECTRODE DUAL RETURN W/ CORD - (50/PK)

## (undated) DEVICE — TRAY SKIN SCRUB PVP WET (20EA/CA) PART #DYND70356 DISCONTINUED

## (undated) DEVICE — SUCTION INSTRUMENT YANKAUER BULBOUS TIP W/O VENT (50EA/CA)

## (undated) DEVICE — CATHETER IV NON-SAFETY 18 GA X 1 1/4 (50/BX 4BX/CA)

## (undated) DEVICE — GLOVE, LITE (PAIR)

## (undated) DEVICE — BOVIE BLADE COATED &INSULATED (50EA/PK)

## (undated) DEVICE — WATER IRRIGATION STERILE 1000ML (12EA/CA)

## (undated) DEVICE — PROTECTOR ULNA NERVE - (36PR/CA)

## (undated) DEVICE — SENSOR SPO2 NEO LNCS ADHESIVE (20/BX) SEE USER NOTES

## (undated) DEVICE — LACTATED RINGERS INJ 1000 ML - (14EA/CA 60CA/PF)

## (undated) DEVICE — SOD. CHL. INJ. 0.9% 1000 ML - (14EA/CA 60CA/PF)

## (undated) DEVICE — MASK ANESTHESIA ADULT  - (100/CA)

## (undated) DEVICE — HEAD HOLDER JUNIOR/ADULT

## (undated) DEVICE — GLOVE BIOGEL SZ 8.5 SURGICAL PF LTX - (50PR/BX 4BX/CA)

## (undated) DEVICE — CANISTER SUCTION RIGID RED 1500CC (40EA/CA)

## (undated) DEVICE — GOWN SURGICAL XX-LARGE - (28EA/CA) SIRUS NON REINFORCED

## (undated) DEVICE — SODIUM CHL IRRIGATION 0.9% 1000ML (12EA/CA)

## (undated) DEVICE — BAG, SPONGE COUNT 50600

## (undated) DEVICE — TUBE E-T HI-LO CUFF 7.0MM (10EA/PK)

## (undated) DEVICE — KIT ANESTHESIA W/CIRCUIT & 3/LT BAG W/FILTER (20EA/CA)

## (undated) DEVICE — SPONGE GAUZESTER 4 X 4 4PLY - (128PK/CA)

## (undated) DEVICE — IMPLANT BREAST MP PLUS SIZER 550CC

## (undated) DEVICE — LIGASURE TISSUE FUSION  - SINGLE USE (6/CA)

## (undated) DEVICE — PACK C-SECTION (2EA/CA)

## (undated) DEVICE — WATER IRRIG. STER. 1500 ML - (9/CA)

## (undated) DEVICE — SET MULTI-ADD FLUID TRANSFER 42 INCH - (50/CA)THIS IS A CUSTOM MADE ITEM 4 TO 6 WEEK LEAD TIME

## (undated) DEVICE — PACK BREAST SM OR - (1EA/CA)

## (undated) DEVICE — NEPTUNE 4 PORT MANIFOLD - (20/PK)

## (undated) DEVICE — ELECTRODE 850 FOAM ADHESIVE - HYDROGEL RADIOTRNSPRNT (50/PK)

## (undated) DEVICE — KIT  I.V. START (100EA/CA)

## (undated) DEVICE — GLOVE BIOGEL PI ULTRATOUCH SZ 7.0 SURGICAL PF LF- POWDER FREE (50/BX 4BX/CA)

## (undated) DEVICE — TUBING CLEARLINK DUO-VENT - C-FLO (48EA/CA)

## (undated) DEVICE — TRAY SPINAL ANESTHESIA NON-SAFETY (10/CA)

## (undated) DEVICE — DETERGENT RENUZYME PLUS 10 OZ PACKET (50/BX)

## (undated) DEVICE — STAPLER SKIN DISP - (6/BX 10BX/CA) VISISTAT

## (undated) DEVICE — SUTURE 1 CHROMIC CTX ETHICON - (36PK/BX)

## (undated) DEVICE — GLOVE BIOGEL SZ 7 SURGICAL PF LTX - (50PR/BX 4BX/CA)

## (undated) DEVICE — TOWELS CLOTH SURGICAL - (4/PK 20PK/CA)

## (undated) DEVICE — SUTURE GENERAL

## (undated) DEVICE — SUTURE 0 GUT-PLAIN (36PK/BX)

## (undated) DEVICE — KIT ROOM DECONTAMINATION

## (undated) DEVICE — SUTURE 3-0 VICRYL PLUS CT-1 - 36 INCH (36/BX)

## (undated) DEVICE — SUTURE 0 VICRYL PLUS CT-1 - 36 INCH (36/BX)

## (undated) DEVICE — SUTURE 3-0 VICRYL PLUSPS-2 - 18 INCH (36/BX)

## (undated) DEVICE — GOWN WARMING STANDARD FLEX - (30/CA)

## (undated) DEVICE — COVER LIGHT HANDLE FLEXIBLE - SOFT (2EA/PK 80PK/CA)

## (undated) DEVICE — TUBE CONNECTING SUCTION - CLEAR PLASTIC STERILE 72 IN (50EA/CA)

## (undated) DEVICE — SET EXTENSION WITH 2 PORTS (48EA/CA) ***PART #2C8610 IS A SUBSTITUTE*****